# Patient Record
Sex: FEMALE | Race: WHITE | Employment: OTHER | URBAN - NONMETROPOLITAN AREA
[De-identification: names, ages, dates, MRNs, and addresses within clinical notes are randomized per-mention and may not be internally consistent; named-entity substitution may affect disease eponyms.]

---

## 2020-08-18 ENCOUNTER — APPOINTMENT (OUTPATIENT)
Dept: GENERAL RADIOLOGY | Age: 70
End: 2020-08-18
Payer: MEDICARE

## 2020-08-18 ENCOUNTER — HOSPITAL ENCOUNTER (EMERGENCY)
Age: 70
Discharge: HOME | End: 2020-08-18
Attending: EMERGENCY MEDICINE
Payer: MEDICARE

## 2020-08-18 VITALS
TEMPERATURE: 97.5 F | WEIGHT: 123 LBS | RESPIRATION RATE: 18 BRPM | HEIGHT: 63 IN | OXYGEN SATURATION: 100 % | HEART RATE: 76 BPM | SYSTOLIC BLOOD PRESSURE: 148 MMHG | DIASTOLIC BLOOD PRESSURE: 88 MMHG | BODY MASS INDEX: 21.79 KG/M2

## 2020-08-18 PROCEDURE — 71045 X-RAY EXAM CHEST 1 VIEW: CPT

## 2020-08-18 PROCEDURE — 99284 EMERGENCY DEPT VISIT MOD MDM: CPT

## 2020-08-18 PROCEDURE — 6370000000 HC RX 637 (ALT 250 FOR IP): Performed by: EMERGENCY MEDICINE

## 2020-08-18 RX ORDER — ACETAMINOPHEN 325 MG/1
650 TABLET ORAL ONCE
Status: COMPLETED | OUTPATIENT
Start: 2020-08-18 | End: 2020-08-18

## 2020-08-18 RX ORDER — SERTRALINE HYDROCHLORIDE 100 MG/1
100 TABLET, FILM COATED ORAL DAILY
COMMUNITY

## 2020-08-18 RX ADMIN — ACETAMINOPHEN 650 MG: 325 TABLET ORAL at 17:37

## 2020-08-18 ASSESSMENT — ENCOUNTER SYMPTOMS: GASTROINTESTINAL NEGATIVE: 1

## 2020-08-18 ASSESSMENT — PAIN DESCRIPTION - PAIN TYPE: TYPE: ACUTE PAIN

## 2020-08-18 ASSESSMENT — PAIN SCALES - GENERAL
PAINLEVEL_OUTOF10: 5
PAINLEVEL_OUTOF10: 6

## 2020-08-18 ASSESSMENT — PAIN DESCRIPTION - LOCATION: LOCATION: FACE
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
## 2020-08-18 NOTE — ED NOTES
C/O pain and swelling to upper w/ two sm cuts , bleeding controlled , teeth intact.  Denies LOC, DENIES HEAD OR NECK PAIN     Arcadio Lovell RN  08/18/20 6305

## 2021-05-20 PROBLEM — E05.90 HYPERTHYROIDISM: Status: ACTIVE | Noted: 2021-05-20

## 2021-05-20 PROBLEM — E05.00 GRAVES DISEASE: Status: ACTIVE | Noted: 2021-05-20

## 2023-11-06 ENCOUNTER — APPOINTMENT (OUTPATIENT)
Dept: CT IMAGING | Age: 73
End: 2023-11-06
Payer: MEDICARE

## 2023-11-06 ENCOUNTER — HOSPITAL ENCOUNTER (EMERGENCY)
Age: 73
Discharge: HOME | End: 2023-11-06
Attending: STUDENT IN AN ORGANIZED HEALTH CARE EDUCATION/TRAINING PROGRAM
Payer: MEDICARE

## 2023-11-06 VITALS
RESPIRATION RATE: 17 BRPM | OXYGEN SATURATION: 98 % | HEART RATE: 77 BPM | HEIGHT: 63 IN | WEIGHT: 152 LBS | BODY MASS INDEX: 26.93 KG/M2 | SYSTOLIC BLOOD PRESSURE: 175 MMHG | DIASTOLIC BLOOD PRESSURE: 90 MMHG | TEMPERATURE: 97.6 F

## 2023-11-06 DIAGNOSIS — S01.81XA FACIAL LACERATION, INITIAL ENCOUNTER: ICD-10-CM

## 2023-11-06 DIAGNOSIS — S09.90XA CLOSED HEAD INJURY, INITIAL ENCOUNTER: ICD-10-CM
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
## 2023-11-06 PROCEDURE — 70450 CT HEAD/BRAIN W/O DYE: CPT

## 2023-11-06 PROCEDURE — 90471 IMMUNIZATION ADMIN: CPT | Performed by: STUDENT IN AN ORGANIZED HEALTH CARE EDUCATION/TRAINING PROGRAM

## 2023-11-06 PROCEDURE — 72125 CT NECK SPINE W/O DYE: CPT

## 2023-11-06 PROCEDURE — 93005 ELECTROCARDIOGRAM TRACING: CPT | Performed by: STUDENT IN AN ORGANIZED HEALTH CARE EDUCATION/TRAINING PROGRAM

## 2023-11-06 PROCEDURE — 6360000002 HC RX W HCPCS: Performed by: STUDENT IN AN ORGANIZED HEALTH CARE EDUCATION/TRAINING PROGRAM

## 2023-11-06 PROCEDURE — 90715 TDAP VACCINE 7 YRS/> IM: CPT | Performed by: STUDENT IN AN ORGANIZED HEALTH CARE EDUCATION/TRAINING PROGRAM

## 2023-11-06 PROCEDURE — 12013 RPR F/E/E/N/L/M 2.6-5.0 CM: CPT

## 2023-11-06 PROCEDURE — 99284 EMERGENCY DEPT VISIT MOD MDM: CPT

## 2023-11-06 RX ORDER — ATORVASTATIN CALCIUM 10 MG/1
10 TABLET, FILM COATED ORAL DAILY
COMMUNITY
Start: 2023-10-27

## 2023-11-06 RX ORDER — IBUPROFEN 600 MG/1
600 TABLET ORAL ONCE
Status: DISCONTINUED | OUTPATIENT
Start: 2023-11-06 | End: 2023-11-06 | Stop reason: HOSPADM

## 2023-11-06 RX ORDER — TETANUS AND DIPHTHERIA TOXOIDS ADSORBED 2; 2 [LF]/.5ML; [LF]/.5ML
0.5 INJECTION INTRAMUSCULAR ONCE
Status: DISCONTINUED | OUTPATIENT
Start: 2023-11-06 | End: 2023-11-06 | Stop reason: HOSPADM

## 2023-11-06 RX ORDER — HYDROXYZINE HYDROCHLORIDE 25 MG/1
12.5-25 TABLET, FILM COATED ORAL 3 TIMES DAILY PRN
COMMUNITY
Start: 2023-10-27

## 2023-11-06 RX ADMIN — TETANUS TOXOID, REDUCED DIPHTHERIA TOXOID AND ACELLULAR PERTUSSIS VACCINE, ADSORBED 0.5 ML: 5; 2.5; 8; 8; 2.5 SUSPENSION INTRAMUSCULAR at 18:56

## 2023-11-06 ASSESSMENT — PAIN DESCRIPTION - LOCATION: LOCATION: FACE;NOSE

## 2023-11-06 ASSESSMENT — PAIN SCALES - GENERAL: PAINLEVEL_OUTOF10: 4

## 2023-11-07 LAB
EKG ATRIAL RATE: 77 BPM
EKG DIAGNOSIS: NORMAL
EKG P AXIS: 35 DEGREES
EKG P-R INTERVAL: 160 MS
EKG Q-T INTERVAL: 390 MS
EKG QRS DURATION: 76 MS
EKG QTC CALCULATION (BAZETT): 441 MS
EKG R AXIS: -12 DEGREES
EKG T AXIS: 18 DEGREES
EKG VENTRICULAR RATE: 77 BPM

## 2023-11-07 PROCEDURE — 93010 ELECTROCARDIOGRAM REPORT: CPT | Performed by: INTERNAL MEDICINE

## 2023-11-07 ASSESSMENT — ENCOUNTER SYMPTOMS
SHORTNESS OF BREATH: 0
ABDOMINAL PAIN: 0
BACK PAIN: 0

## 2023-11-07 NOTE — ED PROVIDER NOTES
DO  Emergency Medicine    (Please note that portions of this note were completed with a voice recognition program.  Efforts were made to edit the dictations but occasionally words are mis-transcribed.)       Lorri Villa DO  Resident  11/07/23 9831

## 2023-11-07 NOTE — DISCHARGE INSTRUCTIONS
You were seen here today after a fall. The CAT scan of your head and neck does not show any injuries. Your laceration was repaired with absorbable sutures. These will naturally dissolve in approximately 3 to 5 days. Do not get the sutures wet for the first 24 hours. Afterwards you may shower normally. Keep the wound clean and dry. You may place bacitracin Neosporin or triple antibiotic over it if you wish. For pain I recommend taking 1000 mg of Tylenol every 8 hours. You may take ibuprofen 600 mg every 6 hours with food if you need additional pain coverage. Return the emergency department for any puslike drainage from her nose, fevers, severe headache, vision changes, numbness tingling weakness, redness around the sutures or any other new worsening concerning complaints.

## 2023-11-17 ENCOUNTER — APPOINTMENT (OUTPATIENT)
Dept: GENERAL RADIOLOGY | Age: 73
End: 2023-11-17
Payer: MEDICARE

## 2023-11-17 ENCOUNTER — HOSPITAL ENCOUNTER (INPATIENT)
Age: 73
LOS: 3 days | Discharge: NURSING, REHAB OR LONG-TERM CARE | DRG: 481 | End: 2023-11-20
Attending: STUDENT IN AN ORGANIZED HEALTH CARE EDUCATION/TRAINING PROGRAM | Admitting: INTERNAL MEDICINE
Payer: MEDICARE

## 2023-11-17 ENCOUNTER — APPOINTMENT (OUTPATIENT)
Dept: CT IMAGING | Age: 73
End: 2023-11-17
Attending: STUDENT IN AN ORGANIZED HEALTH CARE EDUCATION/TRAINING PROGRAM
Payer: MEDICARE

## 2023-11-17 ENCOUNTER — APPOINTMENT (OUTPATIENT)
Dept: MRI IMAGING | Age: 73
End: 2023-11-17
Payer: MEDICARE

## 2023-11-17 ENCOUNTER — HOSPITAL ENCOUNTER (EMERGENCY)
Age: 73
Discharge: ANOTHER HOSPITAL | End: 2023-11-17
Attending: STUDENT IN AN ORGANIZED HEALTH CARE EDUCATION/TRAINING PROGRAM
Payer: MEDICARE

## 2023-11-17 VITALS
BODY MASS INDEX: 26.93 KG/M2 | SYSTOLIC BLOOD PRESSURE: 155 MMHG | RESPIRATION RATE: 18 BRPM | WEIGHT: 152 LBS | HEIGHT: 63 IN | OXYGEN SATURATION: 92 % | HEART RATE: 97 BPM | TEMPERATURE: 98.2 F | DIASTOLIC BLOOD PRESSURE: 78 MMHG

## 2023-11-17 DIAGNOSIS — S72.145A CLOSED NONDISPLACED INTERTROCHANTERIC FRACTURE OF LEFT FEMUR, INITIAL ENCOUNTER (HCC): Primary | ICD-10-CM

## 2023-11-17 DIAGNOSIS — S42.212A CLOSED DISPLACED FRACTURE OF SURGICAL NECK OF LEFT HUMERUS, UNSPECIFIED FRACTURE MORPHOLOGY, INITIAL ENCOUNTER: ICD-10-CM

## 2023-11-17 PROBLEM — S72.002A HIP FRACTURE REQUIRING OPERATIVE REPAIR, LEFT, CLOSED, INITIAL ENCOUNTER (HCC): Status: ACTIVE | Noted: 2023-11-17

## 2023-11-17 LAB
ANION GAP SERPL CALCULATED.3IONS-SCNC: 14 MMOL/L (ref 4–16)
BASOPHILS ABSOLUTE: 0.1 K/CU MM
BASOPHILS RELATIVE PERCENT: 0.5 % (ref 0–1)
BUN SERPL-MCNC: 17 MG/DL (ref 6–23)
CALCIUM SERPL-MCNC: 8.6 MG/DL (ref 8.3–10.6)
CHLORIDE BLD-SCNC: 109 MMOL/L (ref 99–110)
CO2: 20 MMOL/L (ref 21–32)
CREAT SERPL-MCNC: 0.6 MG/DL (ref 0.6–1.1)
DIFFERENTIAL TYPE: ABNORMAL
EOSINOPHILS ABSOLUTE: 0.3 K/CU MM
EOSINOPHILS RELATIVE PERCENT: 2.4 % (ref 0–3)
GFR SERPL CREATININE-BSD FRML MDRD: >60 ML/MIN/1.73M2
GLUCOSE SERPL-MCNC: 145 MG/DL (ref 70–99)
HCT VFR BLD CALC: 35.9 % (ref 37–47)
HEMOGLOBIN: 11.5 GM/DL (ref 12.5–16)
IMMATURE NEUTROPHIL %: 1.2 % (ref 0–0.43)
LYMPHOCYTES ABSOLUTE: 2.6 K/CU MM
LYMPHOCYTES RELATIVE PERCENT: 21.4 % (ref 24–44)
MCH RBC QN AUTO: 30.2 PG (ref 27–31)
MCHC RBC AUTO-ENTMCNC: 32 % (ref 32–36)
MCV RBC AUTO: 94.2 FL (ref 78–100)
MONOCYTES ABSOLUTE: 0.6 K/CU MM
MONOCYTES RELATIVE PERCENT: 5 % (ref 0–4)
PDW BLD-RTO: 13.2 % (ref 11.7–14.9)
PLATELET # BLD: 402 K/CU MM (ref 140–440)
PMV BLD AUTO: 8.9 FL (ref 7.5–11.1)
POTASSIUM SERPL-SCNC: 3.8 MMOL/L (ref 3.5–5.1)
RBC # BLD: 3.81 M/CU MM (ref 4.2–5.4)
SEGMENTED NEUTROPHILS ABSOLUTE COUNT: 8.6 K/CU MM
SEGMENTED NEUTROPHILS RELATIVE PERCENT: 69.5 % (ref 36–66)
SODIUM BLD-SCNC: 143 MMOL/L (ref 135–145)
TOTAL IMMATURE NEUTOROPHIL: 0.15 K/CU MM
WBC # BLD: 12.3 K/CU MM (ref 4–10.5)

## 2023-11-17 PROCEDURE — 71046 X-RAY EXAM CHEST 2 VIEWS: CPT

## 2023-11-17 PROCEDURE — 1200000000 HC SEMI PRIVATE

## 2023-11-17 PROCEDURE — 99285 EMERGENCY DEPT VISIT HI MDM: CPT

## 2023-11-17 PROCEDURE — 73030 X-RAY EXAM OF SHOULDER: CPT

## 2023-11-17 PROCEDURE — 6370000000 HC RX 637 (ALT 250 FOR IP): Performed by: STUDENT IN AN ORGANIZED HEALTH CARE EDUCATION/TRAINING PROGRAM

## 2023-11-17 PROCEDURE — 73721 MRI JNT OF LWR EXTRE W/O DYE: CPT

## 2023-11-17 PROCEDURE — 73502 X-RAY EXAM HIP UNI 2-3 VIEWS: CPT

## 2023-11-17 PROCEDURE — 73080 X-RAY EXAM OF ELBOW: CPT

## 2023-11-17 PROCEDURE — 73700 CT LOWER EXTREMITY W/O DYE: CPT

## 2023-11-17 PROCEDURE — 85025 COMPLETE CBC W/AUTO DIFF WBC: CPT

## 2023-11-17 PROCEDURE — 80048 BASIC METABOLIC PNL TOTAL CA: CPT

## 2023-11-17 RX ORDER — SODIUM CHLORIDE, SODIUM LACTATE, POTASSIUM CHLORIDE, CALCIUM CHLORIDE 600; 310; 30; 20 MG/100ML; MG/100ML; MG/100ML; MG/100ML
INJECTION, SOLUTION INTRAVENOUS CONTINUOUS
Status: DISCONTINUED | OUTPATIENT
Start: 2023-11-18 | End: 2023-11-19

## 2023-11-17 RX ORDER — IBUPROFEN 600 MG/1
600 TABLET ORAL ONCE
Status: COMPLETED | OUTPATIENT
Start: 2023-11-17 | End: 2023-11-17

## 2023-11-17 RX ORDER — ACETAMINOPHEN 500 MG
1000 TABLET ORAL ONCE
Status: COMPLETED | OUTPATIENT
Start: 2023-11-17 | End: 2023-11-17

## 2023-11-17 RX ORDER — MAGNESIUM SULFATE IN WATER 40 MG/ML
2000 INJECTION, SOLUTION INTRAVENOUS PRN
Status: DISCONTINUED | OUTPATIENT
Start: 2023-11-17 | End: 2023-11-20 | Stop reason: HOSPADM

## 2023-11-17 RX ORDER — SODIUM CHLORIDE 0.9 % (FLUSH) 0.9 %
5-40 SYRINGE (ML) INJECTION PRN
Status: DISCONTINUED | OUTPATIENT
Start: 2023-11-17 | End: 2023-11-20 | Stop reason: HOSPADM

## 2023-11-17 RX ORDER — SODIUM CHLORIDE 0.9 % (FLUSH) 0.9 %
5-40 SYRINGE (ML) INJECTION EVERY 12 HOURS SCHEDULED
Status: DISCONTINUED | OUTPATIENT
Start: 2023-11-18 | End: 2023-11-20 | Stop reason: HOSPADM

## 2023-11-17 RX ORDER — ACETAMINOPHEN 650 MG/1
650 SUPPOSITORY RECTAL EVERY 6 HOURS PRN
Status: DISCONTINUED | OUTPATIENT
Start: 2023-11-17 | End: 2023-11-20 | Stop reason: HOSPADM

## 2023-11-17 RX ORDER — ONDANSETRON 4 MG/1
4 TABLET, ORALLY DISINTEGRATING ORAL EVERY 8 HOURS PRN
Status: DISCONTINUED | OUTPATIENT
Start: 2023-11-17 | End: 2023-11-20 | Stop reason: HOSPADM

## 2023-11-17 RX ORDER — POTASSIUM CHLORIDE 7.45 MG/ML
10 INJECTION INTRAVENOUS PRN
Status: DISCONTINUED | OUTPATIENT
Start: 2023-11-17 | End: 2023-11-20 | Stop reason: HOSPADM

## 2023-11-17 RX ORDER — POTASSIUM CHLORIDE 20 MEQ/1
40 TABLET, EXTENDED RELEASE ORAL PRN
Status: DISCONTINUED | OUTPATIENT
Start: 2023-11-17 | End: 2023-11-20 | Stop reason: HOSPADM

## 2023-11-17 RX ORDER — SODIUM CHLORIDE 9 MG/ML
INJECTION, SOLUTION INTRAVENOUS PRN
Status: DISCONTINUED | OUTPATIENT
Start: 2023-11-17 | End: 2023-11-20 | Stop reason: HOSPADM

## 2023-11-17 RX ORDER — ENOXAPARIN SODIUM 100 MG/ML
40 INJECTION SUBCUTANEOUS DAILY
Status: DISCONTINUED | OUTPATIENT
Start: 2023-11-18 | End: 2023-11-20 | Stop reason: HOSPADM

## 2023-11-17 RX ORDER — ONDANSETRON 2 MG/ML
4 INJECTION INTRAMUSCULAR; INTRAVENOUS EVERY 6 HOURS PRN
Status: DISCONTINUED | OUTPATIENT
Start: 2023-11-17 | End: 2023-11-20 | Stop reason: HOSPADM

## 2023-11-17 RX ORDER — POLYETHYLENE GLYCOL 3350 17 G/17G
17 POWDER, FOR SOLUTION ORAL DAILY PRN
Status: DISCONTINUED | OUTPATIENT
Start: 2023-11-17 | End: 2023-11-20 | Stop reason: HOSPADM

## 2023-11-17 RX ORDER — ACETAMINOPHEN 325 MG/1
650 TABLET ORAL EVERY 6 HOURS PRN
Status: DISCONTINUED | OUTPATIENT
Start: 2023-11-17 | End: 2023-11-20 | Stop reason: HOSPADM

## 2023-11-17 RX ORDER — NAPROXEN 500 MG/1
500 TABLET ORAL 2 TIMES DAILY WITH MEALS
Status: ON HOLD | COMMUNITY

## 2023-11-17 RX ADMIN — IBUPROFEN 600 MG: 600 TABLET, FILM COATED ORAL at 14:48

## 2023-11-17 RX ADMIN — ACETAMINOPHEN 1000 MG: 500 TABLET ORAL at 10:50

## 2023-11-17 ASSESSMENT — PAIN SCALES - GENERAL
PAINLEVEL_OUTOF10: 9
PAINLEVEL_OUTOF10: 1
PAINLEVEL_OUTOF10: 8
PAINLEVEL_OUTOF10: 6

## 2023-11-17 ASSESSMENT — LIFESTYLE VARIABLES
HOW OFTEN DO YOU HAVE A DRINK CONTAINING ALCOHOL: NEVER
HOW MANY STANDARD DRINKS CONTAINING ALCOHOL DO YOU HAVE ON A TYPICAL DAY: PATIENT DOES NOT DRINK

## 2023-11-17 ASSESSMENT — PAIN DESCRIPTION - FREQUENCY: FREQUENCY: INTERMITTENT

## 2023-11-17 ASSESSMENT — PAIN - FUNCTIONAL ASSESSMENT
PAIN_FUNCTIONAL_ASSESSMENT: PREVENTS OR INTERFERES SOME ACTIVE ACTIVITIES AND ADLS
PAIN_FUNCTIONAL_ASSESSMENT: 0-10

## 2023-11-17 ASSESSMENT — PAIN DESCRIPTION - PAIN TYPE: TYPE: ACUTE PAIN

## 2023-11-17 ASSESSMENT — ENCOUNTER SYMPTOMS
COUGH: 0
VOMITING: 0
COLOR CHANGE: 1
SHORTNESS OF BREATH: 0
NAUSEA: 0
ABDOMINAL PAIN: 0

## 2023-11-17 ASSESSMENT — PAIN DESCRIPTION - ORIENTATION
ORIENTATION: LEFT

## 2023-11-17 ASSESSMENT — PAIN DESCRIPTION - DESCRIPTORS
DESCRIPTORS: ACHING
DESCRIPTORS: THROBBING
DESCRIPTORS: SHARP;ACHING

## 2023-11-17 ASSESSMENT — PAIN DESCRIPTION - LOCATION
LOCATION: HIP;SHOULDER
LOCATION: HIP
LOCATION: ARM
LOCATION: GROIN;ARM

## 2023-11-17 NOTE — ED NOTES
Arm sling with shoulder immobilizer to left arm. Pt tolerated well.  remains at bedside.      Kurt Parks RN  35/33/56 6820

## 2023-11-17 NOTE — ED NOTES
Patient assisted to bedside commode. Non-weight bearing on left leg. Tolerated well.      Atilio Gill RN  49/97/65 2068

## 2023-11-17 NOTE — ED NOTES
Pt ambulated in hallway. Pt still having pain with ambulation in left hip and leg. Dr. Aisha Rao in [place] to see pt ambulate. See further orders from Dr. Aisha Rao.      Shalini Bolton RN  83/12/18 5911

## 2023-11-18 ENCOUNTER — ANESTHESIA (OUTPATIENT)
Dept: OPERATING ROOM | Age: 73
End: 2023-11-18
Payer: MEDICARE

## 2023-11-18 ENCOUNTER — ANESTHESIA EVENT (OUTPATIENT)
Dept: OPERATING ROOM | Age: 73
End: 2023-11-18
Payer: MEDICARE

## 2023-11-18 ENCOUNTER — APPOINTMENT (OUTPATIENT)
Dept: GENERAL RADIOLOGY | Age: 73
DRG: 481 | End: 2023-11-18
Attending: STUDENT IN AN ORGANIZED HEALTH CARE EDUCATION/TRAINING PROGRAM
Payer: MEDICARE

## 2023-11-18 LAB
ANION GAP SERPL CALCULATED.3IONS-SCNC: 9 MMOL/L (ref 4–16)
BASOPHILS ABSOLUTE: 0.1 K/CU MM
BASOPHILS RELATIVE PERCENT: 0.5 % (ref 0–1)
BUN SERPL-MCNC: 18 MG/DL (ref 6–23)
CALCIUM SERPL-MCNC: 8 MG/DL (ref 8.3–10.6)
CHLORIDE BLD-SCNC: 112 MMOL/L (ref 99–110)
CO2: 22 MMOL/L (ref 21–32)
CREAT SERPL-MCNC: 0.6 MG/DL (ref 0.6–1.1)
DIFFERENTIAL TYPE: ABNORMAL
EOSINOPHILS ABSOLUTE: 0.4 K/CU MM
EOSINOPHILS RELATIVE PERCENT: 3.3 % (ref 0–3)
GFR SERPL CREATININE-BSD FRML MDRD: >60 ML/MIN/1.73M2
GLUCOSE SERPL-MCNC: 99 MG/DL (ref 70–99)
HCT VFR BLD CALC: 32.3 % (ref 37–47)
HEMOGLOBIN: 10.6 GM/DL (ref 12.5–16)
IMMATURE NEUTROPHIL %: 1.5 % (ref 0–0.43)
INR BLD: 1 INDEX
LYMPHOCYTES ABSOLUTE: 2.8 K/CU MM
LYMPHOCYTES RELATIVE PERCENT: 24.9 % (ref 24–44)
MCH RBC QN AUTO: 30.5 PG (ref 27–31)
MCHC RBC AUTO-ENTMCNC: 32.8 % (ref 32–36)
MCV RBC AUTO: 93.1 FL (ref 78–100)
MONOCYTES ABSOLUTE: 0.7 K/CU MM
MONOCYTES RELATIVE PERCENT: 6.4 % (ref 0–4)
NUCLEATED RBC %: 0 %
PDW BLD-RTO: 13.2 % (ref 11.7–14.9)
PLATELET # BLD: 339 K/CU MM (ref 140–440)
PMV BLD AUTO: 8.5 FL (ref 7.5–11.1)
POTASSIUM SERPL-SCNC: 4.2 MMOL/L (ref 3.5–5.1)
PROTHROMBIN TIME: 13.7 SECONDS (ref 11.7–14.5)
RBC # BLD: 3.47 M/CU MM (ref 4.2–5.4)
SEGMENTED NEUTROPHILS ABSOLUTE COUNT: 7 K/CU MM
SEGMENTED NEUTROPHILS RELATIVE PERCENT: 63.4 % (ref 36–66)
SODIUM BLD-SCNC: 143 MMOL/L (ref 135–145)
TOTAL IMMATURE NEUTOROPHIL: 0.17 K/CU MM
TOTAL NUCLEATED RBC: 0 K/CU MM
WBC # BLD: 11 K/CU MM (ref 4–10.5)

## 2023-11-18 PROCEDURE — 6360000002 HC RX W HCPCS: Performed by: INTERNAL MEDICINE

## 2023-11-18 PROCEDURE — 94761 N-INVAS EAR/PLS OXIMETRY MLT: CPT

## 2023-11-18 PROCEDURE — 6370000000 HC RX 637 (ALT 250 FOR IP): Performed by: ORTHOPAEDIC SURGERY

## 2023-11-18 PROCEDURE — P9045 ALBUMIN (HUMAN), 5%, 250 ML: HCPCS | Performed by: NURSE ANESTHETIST, CERTIFIED REGISTERED

## 2023-11-18 PROCEDURE — 7100000001 HC PACU RECOVERY - ADDTL 15 MIN: Performed by: ORTHOPAEDIC SURGERY

## 2023-11-18 PROCEDURE — 2700000000 HC OXYGEN THERAPY PER DAY

## 2023-11-18 PROCEDURE — 36415 COLL VENOUS BLD VENIPUNCTURE: CPT

## 2023-11-18 PROCEDURE — 93005 ELECTROCARDIOGRAM TRACING: CPT | Performed by: INTERNAL MEDICINE

## 2023-11-18 PROCEDURE — C1769 GUIDE WIRE: HCPCS | Performed by: ORTHOPAEDIC SURGERY

## 2023-11-18 PROCEDURE — C1713 ANCHOR/SCREW BN/BN,TIS/BN: HCPCS | Performed by: ORTHOPAEDIC SURGERY

## 2023-11-18 PROCEDURE — 3700000001 HC ADD 15 MINUTES (ANESTHESIA): Performed by: ORTHOPAEDIC SURGERY

## 2023-11-18 PROCEDURE — 0QS706Z REPOSITION LEFT UPPER FEMUR WITH INTRAMEDULLARY INTERNAL FIXATION DEVICE, OPEN APPROACH: ICD-10-PCS | Performed by: ORTHOPAEDIC SURGERY

## 2023-11-18 PROCEDURE — 6360000002 HC RX W HCPCS: Performed by: NURSE ANESTHETIST, CERTIFIED REGISTERED

## 2023-11-18 PROCEDURE — 2709999900 HC NON-CHARGEABLE SUPPLY: Performed by: ORTHOPAEDIC SURGERY

## 2023-11-18 PROCEDURE — 7100000000 HC PACU RECOVERY - FIRST 15 MIN: Performed by: ORTHOPAEDIC SURGERY

## 2023-11-18 PROCEDURE — 6360000002 HC RX W HCPCS: Performed by: ANESTHESIOLOGY

## 2023-11-18 PROCEDURE — 85025 COMPLETE CBC W/AUTO DIFF WBC: CPT

## 2023-11-18 PROCEDURE — 80048 BASIC METABOLIC PNL TOTAL CA: CPT

## 2023-11-18 PROCEDURE — 3600000004 HC SURGERY LEVEL 4 BASE: Performed by: ORTHOPAEDIC SURGERY

## 2023-11-18 PROCEDURE — 3700000000 HC ANESTHESIA ATTENDED CARE: Performed by: ORTHOPAEDIC SURGERY

## 2023-11-18 PROCEDURE — 3600000014 HC SURGERY LEVEL 4 ADDTL 15MIN: Performed by: ORTHOPAEDIC SURGERY

## 2023-11-18 PROCEDURE — 2720000010 HC SURG SUPPLY STERILE: Performed by: ORTHOPAEDIC SURGERY

## 2023-11-18 PROCEDURE — 76000 FLUOROSCOPY <1 HR PHYS/QHP: CPT

## 2023-11-18 PROCEDURE — 1200000000 HC SEMI PRIVATE

## 2023-11-18 PROCEDURE — 2580000003 HC RX 258: Performed by: INTERNAL MEDICINE

## 2023-11-18 PROCEDURE — 6360000002 HC RX W HCPCS: Performed by: ORTHOPAEDIC SURGERY

## 2023-11-18 PROCEDURE — 85610 PROTHROMBIN TIME: CPT

## 2023-11-18 DEVICE — SCREW BNE L38MM DIA5MM TIB LT GRN TI ST CANN LOK FULL THRD: Type: IMPLANTABLE DEVICE | Site: HIP | Status: FUNCTIONAL

## 2023-11-18 DEVICE — IMPLANTABLE DEVICE: Type: IMPLANTABLE DEVICE | Site: HIP | Status: FUNCTIONAL

## 2023-11-18 DEVICE — NAIL IM L380MM DIA10MM 125DEG LNG L PROX FEM GRN TI CANN: Type: IMPLANTABLE DEVICE | Site: HIP | Status: FUNCTIONAL

## 2023-11-18 RX ORDER — SODIUM CHLORIDE 0.9 % (FLUSH) 0.9 %
5-40 SYRINGE (ML) INJECTION EVERY 12 HOURS SCHEDULED
Status: DISCONTINUED | OUTPATIENT
Start: 2023-11-18 | End: 2023-11-18 | Stop reason: HOSPADM

## 2023-11-18 RX ORDER — KETOROLAC TROMETHAMINE 30 MG/ML
15 INJECTION, SOLUTION INTRAMUSCULAR; INTRAVENOUS EVERY 6 HOURS PRN
Status: DISCONTINUED | OUTPATIENT
Start: 2023-11-18 | End: 2023-11-20

## 2023-11-18 RX ORDER — MEPERIDINE HYDROCHLORIDE 25 MG/ML
12.5 INJECTION INTRAMUSCULAR; INTRAVENOUS; SUBCUTANEOUS EVERY 5 MIN PRN
Status: DISCONTINUED | OUTPATIENT
Start: 2023-11-18 | End: 2023-11-18 | Stop reason: HOSPADM

## 2023-11-18 RX ORDER — ONDANSETRON 2 MG/ML
INJECTION INTRAMUSCULAR; INTRAVENOUS PRN
Status: DISCONTINUED | OUTPATIENT
Start: 2023-11-18 | End: 2023-11-18 | Stop reason: SDUPTHER

## 2023-11-18 RX ORDER — ALBUMIN, HUMAN INJ 5% 5 %
SOLUTION INTRAVENOUS PRN
Status: DISCONTINUED | OUTPATIENT
Start: 2023-11-18 | End: 2023-11-18 | Stop reason: SDUPTHER

## 2023-11-18 RX ORDER — LIDOCAINE HYDROCHLORIDE 20 MG/ML
INJECTION, SOLUTION INTRAVENOUS PRN
Status: DISCONTINUED | OUTPATIENT
Start: 2023-11-18 | End: 2023-11-18 | Stop reason: SDUPTHER

## 2023-11-18 RX ORDER — ATORVASTATIN CALCIUM 10 MG/1
10 TABLET, FILM COATED ORAL NIGHTLY
Status: DISCONTINUED | OUTPATIENT
Start: 2023-11-18 | End: 2023-11-20 | Stop reason: HOSPADM

## 2023-11-18 RX ORDER — PROPOFOL 10 MG/ML
INJECTION, EMULSION INTRAVENOUS CONTINUOUS PRN
Status: DISCONTINUED | OUTPATIENT
Start: 2023-11-18 | End: 2023-11-18 | Stop reason: SDUPTHER

## 2023-11-18 RX ORDER — DROPERIDOL 2.5 MG/ML
0.62 INJECTION, SOLUTION INTRAMUSCULAR; INTRAVENOUS
Status: DISCONTINUED | OUTPATIENT
Start: 2023-11-18 | End: 2023-11-18 | Stop reason: HOSPADM

## 2023-11-18 RX ORDER — OXYCODONE HYDROCHLORIDE 5 MG/1
5 TABLET ORAL
Status: DISCONTINUED | OUTPATIENT
Start: 2023-11-18 | End: 2023-11-18 | Stop reason: HOSPADM

## 2023-11-18 RX ORDER — SODIUM CHLORIDE 9 MG/ML
INJECTION, SOLUTION INTRAVENOUS PRN
Status: DISCONTINUED | OUTPATIENT
Start: 2023-11-18 | End: 2023-11-18 | Stop reason: HOSPADM

## 2023-11-18 RX ORDER — PHENYLEPHRINE HCL IN 0.9% NACL 1 MG/10 ML
SYRINGE (ML) INTRAVENOUS PRN
Status: DISCONTINUED | OUTPATIENT
Start: 2023-11-18 | End: 2023-11-18 | Stop reason: SDUPTHER

## 2023-11-18 RX ORDER — BUPIVACAINE HYDROCHLORIDE 7.5 MG/ML
INJECTION, SOLUTION INTRASPINAL
Status: COMPLETED | OUTPATIENT
Start: 2023-11-18 | End: 2023-11-18

## 2023-11-18 RX ORDER — DEXAMETHASONE SODIUM PHOSPHATE 4 MG/ML
INJECTION, SOLUTION INTRA-ARTICULAR; INTRALESIONAL; INTRAMUSCULAR; INTRAVENOUS; SOFT TISSUE PRN
Status: DISCONTINUED | OUTPATIENT
Start: 2023-11-18 | End: 2023-11-18 | Stop reason: SDUPTHER

## 2023-11-18 RX ORDER — CEFAZOLIN SODIUM 1 G/3ML
INJECTION, POWDER, FOR SOLUTION INTRAMUSCULAR; INTRAVENOUS PRN
Status: DISCONTINUED | OUTPATIENT
Start: 2023-11-18 | End: 2023-11-18 | Stop reason: SDUPTHER

## 2023-11-18 RX ORDER — HYDRALAZINE HYDROCHLORIDE 20 MG/ML
10 INJECTION INTRAMUSCULAR; INTRAVENOUS
Status: DISCONTINUED | OUTPATIENT
Start: 2023-11-18 | End: 2023-11-18 | Stop reason: HOSPADM

## 2023-11-18 RX ORDER — FENTANYL CITRATE 50 UG/ML
50 INJECTION, SOLUTION INTRAMUSCULAR; INTRAVENOUS EVERY 5 MIN PRN
Status: DISCONTINUED | OUTPATIENT
Start: 2023-11-18 | End: 2023-11-18 | Stop reason: HOSPADM

## 2023-11-18 RX ORDER — PROPOFOL 10 MG/ML
INJECTION, EMULSION INTRAVENOUS PRN
Status: DISCONTINUED | OUTPATIENT
Start: 2023-11-18 | End: 2023-11-18 | Stop reason: SDUPTHER

## 2023-11-18 RX ORDER — SODIUM CHLORIDE, SODIUM LACTATE, POTASSIUM CHLORIDE, CALCIUM CHLORIDE 600; 310; 30; 20 MG/100ML; MG/100ML; MG/100ML; MG/100ML
INJECTION, SOLUTION INTRAVENOUS CONTINUOUS PRN
Status: DISCONTINUED | OUTPATIENT
Start: 2023-11-18 | End: 2023-11-18 | Stop reason: SDUPTHER

## 2023-11-18 RX ORDER — ONDANSETRON 2 MG/ML
4 INJECTION INTRAMUSCULAR; INTRAVENOUS
Status: DISCONTINUED | OUTPATIENT
Start: 2023-11-18 | End: 2023-11-18 | Stop reason: HOSPADM

## 2023-11-18 RX ORDER — SODIUM CHLORIDE 9 MG/ML
INJECTION, SOLUTION INTRAVENOUS CONTINUOUS
Status: DISCONTINUED | OUTPATIENT
Start: 2023-11-18 | End: 2023-11-19

## 2023-11-18 RX ORDER — LABETALOL HYDROCHLORIDE 5 MG/ML
10 INJECTION, SOLUTION INTRAVENOUS
Status: DISCONTINUED | OUTPATIENT
Start: 2023-11-18 | End: 2023-11-18 | Stop reason: HOSPADM

## 2023-11-18 RX ORDER — SODIUM CHLORIDE 0.9 % (FLUSH) 0.9 %
5-40 SYRINGE (ML) INJECTION PRN
Status: DISCONTINUED | OUTPATIENT
Start: 2023-11-18 | End: 2023-11-18 | Stop reason: HOSPADM

## 2023-11-18 RX ADMIN — SALINE NASAL SPRAY 2 SPRAY: 1.5 SOLUTION NASAL at 21:05

## 2023-11-18 RX ADMIN — SODIUM CHLORIDE, PRESERVATIVE FREE 10 ML: 5 INJECTION INTRAVENOUS at 00:58

## 2023-11-18 RX ADMIN — PROPOFOL 120 MCG/KG/MIN: 10 INJECTION, EMULSION INTRAVENOUS at 12:53

## 2023-11-18 RX ADMIN — SERTRALINE HYDROCHLORIDE 100 MG: 50 TABLET ORAL at 20:12

## 2023-11-18 RX ADMIN — SODIUM CHLORIDE, POTASSIUM CHLORIDE, SODIUM LACTATE AND CALCIUM CHLORIDE: 600; 310; 30; 20 INJECTION, SOLUTION INTRAVENOUS at 01:01

## 2023-11-18 RX ADMIN — Medication 100 MCG: at 12:53

## 2023-11-18 RX ADMIN — Medication 100 MCG: at 13:02

## 2023-11-18 RX ADMIN — CEFAZOLIN 2 G: 1 INJECTION, POWDER, FOR SOLUTION INTRAMUSCULAR; INTRAVENOUS; PARENTERAL at 12:57

## 2023-11-18 RX ADMIN — ACETAMINOPHEN 650 MG: 325 TABLET ORAL at 22:29

## 2023-11-18 RX ADMIN — ALBUMIN (HUMAN) 250 ML: 12.5 INJECTION, SOLUTION INTRAVENOUS at 12:55

## 2023-11-18 RX ADMIN — Medication 100 MCG: at 12:45

## 2023-11-18 RX ADMIN — ATORVASTATIN CALCIUM 10 MG: 10 TABLET, FILM COATED ORAL at 20:12

## 2023-11-18 RX ADMIN — ONDANSETRON 4 MG: 2 INJECTION INTRAMUSCULAR; INTRAVENOUS at 13:15

## 2023-11-18 RX ADMIN — Medication 100 MCG: at 13:15

## 2023-11-18 RX ADMIN — SODIUM CHLORIDE, SODIUM LACTATE, POTASSIUM CHLORIDE, CALCIUM CHLORIDE: 600; 310; 30; 20 INJECTION, SOLUTION INTRAVENOUS at 12:30

## 2023-11-18 RX ADMIN — LIDOCAINE HYDROCHLORIDE 40 MG: 20 INJECTION, SOLUTION INTRAVENOUS at 12:37

## 2023-11-18 RX ADMIN — KETOROLAC TROMETHAMINE 15 MG: 30 INJECTION, SOLUTION INTRAMUSCULAR; INTRAVENOUS at 20:11

## 2023-11-18 RX ADMIN — KETOROLAC TROMETHAMINE 15 MG: 30 INJECTION, SOLUTION INTRAMUSCULAR; INTRAVENOUS at 01:07

## 2023-11-18 RX ADMIN — DEXAMETHASONE SODIUM PHOSPHATE 4 MG: 4 INJECTION, SOLUTION INTRAMUSCULAR; INTRAVENOUS at 13:15

## 2023-11-18 RX ADMIN — BUPIVACAINE HYDROCHLORIDE IN DEXTROSE 10 MG: 7.5 INJECTION, SOLUTION SUBARACHNOID at 12:43

## 2023-11-18 RX ADMIN — PROPOFOL 50 MG: 10 INJECTION, EMULSION INTRAVENOUS at 12:37

## 2023-11-18 ASSESSMENT — PAIN SCALES - GENERAL
PAINLEVEL_OUTOF10: 7
PAINLEVEL_OUTOF10: 7
PAINLEVEL_OUTOF10: 0
PAINLEVEL_OUTOF10: 8
PAINLEVEL_OUTOF10: 0

## 2023-11-18 ASSESSMENT — PAIN DESCRIPTION - LOCATION
LOCATION: ARM;HIP
LOCATION: HEAD
LOCATION: SHOULDER;ELBOW

## 2023-11-18 ASSESSMENT — PAIN DESCRIPTION - ORIENTATION
ORIENTATION: MID
ORIENTATION: LEFT
ORIENTATION: LEFT

## 2023-11-18 ASSESSMENT — PAIN DESCRIPTION - ONSET: ONSET: ON-GOING

## 2023-11-18 ASSESSMENT — PAIN DESCRIPTION - DESCRIPTORS
DESCRIPTORS: SHOOTING
DESCRIPTORS: ACHING

## 2023-11-18 ASSESSMENT — PAIN DESCRIPTION - PAIN TYPE: TYPE: SURGICAL PAIN

## 2023-11-18 ASSESSMENT — PAIN - FUNCTIONAL ASSESSMENT
PAIN_FUNCTIONAL_ASSESSMENT: PREVENTS OR INTERFERES SOME ACTIVE ACTIVITIES AND ADLS
PAIN_FUNCTIONAL_ASSESSMENT: ACTIVITIES ARE NOT PREVENTED
PAIN_FUNCTIONAL_ASSESSMENT: PREVENTS OR INTERFERES SOME ACTIVE ACTIVITIES AND ADLS

## 2023-11-18 ASSESSMENT — PAIN DESCRIPTION - FREQUENCY: FREQUENCY: INTERMITTENT

## 2023-11-18 NOTE — PROGRESS NOTES
Patient back on the unit from PACU. Patient is alert and awake and resting quietly in bed. Patient denies pain at this time. Patient is able to move feet,toes with out pain. Noted pedal pulses are strong. Patient is starting to have feeling in both feet. No other concerns voiced. Vitals WNL.   134/66 HR 78 Resp 18 O2 95% on 2L per NS.

## 2023-11-18 NOTE — PROGRESS NOTES
4 Eyes Skin Assessment     NAME:  Hali Pack  YOB: 1950  MEDICAL RECORD NUMBER:  [id number]    The patient is being assessed for  Admission    I agree that at least one RN has performed a thorough Head to Toe Skin Assessment on the patient. ALL assessment sites listed below have been assessed. Areas assessed by both nurses:    Head, Face, Ears, Shoulders, Back, Chest, Arms, Elbows, Hands, Sacrum. Buttock, Coccyx, Ischium, Legs. Feet and Heels, and Under Medical Devices         Does the Patient have a Wound? Yes wound(s) were present on assessment.  LDA wound assessment was Initiated and completed by RN       Kendall Prevention initiated by RN: Yes  Wound Care Orders initiated by RN: Yes    Pressure Injury (Stage 3,4, Unstageable, DTI, NWPT, and Complex wounds) if present, place Wound referral order by RN under : No    New Ostomies, if present place, Ostomy referral order under : No     Nurse 1 eSignature: Electronically signed by Marques Sánchez RN on 11/17/23 at 9:35 PM EST    **SHARE this note so that the co-signing nurse can place an eSignature**    Nurse 2 eSignature: Electronically signed by Daquan Anaya LPN on 91/03/36 at 2:19 PM EST

## 2023-11-18 NOTE — PROGRESS NOTES
1355: Pt arrived to PACU from OR. Monitors applied, alarms on. Surgical sites clean and dry x3 sites. Report obtained from Dr. Mitchel De Leon and Aiden Lindsay RN. Pt able to wiggle toes, states feeling at just below belly button. Will continue to monitor. 1415: Pt states feeling to mid thigh at this time. 1430: Pt able to wiggle carol toes, and states feeling to knee. No complaints of pain at this time. Report called to [facility] for room 1120.   1435: Pt transferred to room 1120.

## 2023-11-18 NOTE — ANESTHESIA POSTPROCEDURE EVALUATION
Department of Anesthesiology  Postprocedure Note    Patient: Christina Puri  MRN: [id number]  YOB: 1950  Date of evaluation: 11/18/2023      Procedure Summary     Date: 11/18/23 Room / Location: [address] [place] [address] [address] [facility]    Anesthesia Start: 8928 Anesthesia Stop:     Procedure: HIP IM NAIL BRIE INSERTION (Left: Hip) Diagnosis:       Closed fracture of left hip, initial encounter ([address])      (Closed fracture of left hip, initial encounter ([address]) Elisha Lanier)    Surgeons: Arturo Urias MD Responsible Provider: Critsina Johnson MD    Anesthesia Type: spinal, MAC ASA Status: 2 - Emergent          Anesthesia Type: No value filed.     Valentina Phase I:      Valentina Phase II:        Anesthesia Post Evaluation    Patient location during evaluation: PACU  Patient participation: complete - patient participated  Level of consciousness: awake  Pain score: 0  Airway patency: patent  Nausea & Vomiting: no nausea and no vomiting  Complications: no  Cardiovascular status: hemodynamically stable  Respiratory status: acceptable and face mask  Hydration status: stable  Pain management: adequate

## 2023-11-18 NOTE — CONSULTS
Consultation    Christina Khushi (1950)    11/18/2023      CHIEF COMPLAINT: 1. Left hip IT fracture 2. left shoulder proximal humerus fracture    History Obtained From:  patient, electronic medical record    HISTORY OF PRESENT ILLNESS:      The patient is a 67 y.o. female  who presents with the above injuries. Patient fell and xray / MRI evaluation at [facility] identified the fractures. Patient was admitted for medical management and orthopedic consultation was obtained for fracture care. Past Medical History         Diagnosis Date    Hyperlipidemia        Past Surgical History         Procedure Laterality Date    APPENDECTOMY      CATARACT REMOVAL      CHOLECYSTECTOMY      COLONOSCOPY      EYE SURGERY      HYSTERECTOMY, TOTAL ABDOMINAL (CERVIX REMOVED)      TOTAL THYROIDECTOMY         Medications Prior to Admission:     Prior to Admission medications    Medication Sig Start Date End Date Taking? Authorizing Provider   naproxen (NAPROSYN) 500 MG tablet Take 1 tablet by mouth 2 times daily (with meals)    Gita Johnson MD   atorvastatin (LIPITOR) 10 MG tablet Take 1 tablet by mouth nightly 10/27/23   Gita Johnson MD   hydrOXYzine HCl (ATARAX) 25 MG tablet Take 0.5-1 tablets by mouth 3 times daily as needed  Patient not taking: Reported on 11/17/2023 10/27/23   Gita Johnson MD   levothyroxine (SYNTHROID) 50 MCG tablet Take 1 tablet by mouth daily  Patient taking differently: Take 137 mcg by mouth every morning (before breakfast) 6/4/21   Nory Barr MD   sertraline (ZOLOFT) 100 MG tablet Take 1 tablet by mouth nightly    ProviderGita MD       Allergies     Tetracyclines & related    Social History   TOBACCO:   reports that she has never smoked. She has never used smokeless tobacco.  ETOH:   reports no history of alcohol use.   Patient currently lives with family      Family History

## 2023-11-18 NOTE — OP NOTE
Operative Note      Patient: Alexsandra Garber  YOB: 1950  MRN: [id number]    Date of Procedure: 11/18/2023    Pre-Op Diagnosis Codes:     * Closed fracture of left hip, initial encounter ([address] ) [S72.002A]    Post-Op Diagnosis: Same       Procedure(s):  HIP IM NAIL BRIE INSERTION    Surgeon(s):  Mika Riojas MD    Assistant:   * No surgical staff found *    Anesthesia: Spinal    Estimated Blood Loss (mL): less than 50     Complications: None    Specimens:   * No specimens in log *    Implants:  Implant Name Type Inv. Item Serial No.  Lot No. LRB No. Used Action   NAIL IM [id number] [id number] 125DEG LNG L PROX FEM GRN TI KAL[place]NA - [id number]  NAIL IM L380MM [id number] 125DEG LNG L PROX FEM GRN TI KAL[place]NA  DEPU[employer]WD [id number] Left 1 Implanted   BLADE IM L80MM DIA10.35MM PROX FEM G TI K[place]NA FEN ARNOLD FOR - [id number]  BLADE IM L80MM DIA10.35MM PROX FEM G TI KAL[place]NA FEN ARNOLD FOR  DEPU[employer]WD [id number] Left 1 Implanted   SCREW BNE L38MM DIA5MM TIB LT GRN TI ST KAL[place]NA HELENA FULL THRD - [id number]  SCREW BNE L38MM DIA5MM TIB LT GRN TI ST [place] HELENA FULL THRD  DEPU[employer]- [id number] Left 1 Implanted         Drains: * No LDAs found *    Findings: see dictation        Detailed Description of Procedure:         Surgical Indications  The patient presented to the emergency room and was diagnosed with an Intertrochanteric hip fracture. The patient was admitted to the hospital and received medical clearance. The patient chose to proceed with closed reduction as needed with intramedullary nailing. Risks, benefits, expected outcomes and potential complications were discussed. At no time were any guarantees implied or stated. The patient electively signed the consent form. Patient Positioning and Surgical Prep  The patient was seen in the holding area and the appropriate extremity marked with an indelible pen.   The patient was taken to the operative suite, identified and while on the hospital bed, spinal anesthesia was administered. The patient was transferred to the fracture table. The affected leg was placed in boot traction after the foot was well padded. The unaffected leg was gently abducted and externally rotated. The fracture was well visualized using biplanar fluoroscopy and the fracture reduced or manipulated as required. The lower extremity was prepped from the flank to knee with Duraprep and then draped in the normal sterile fashion. Exposure  An incision was made proximal to the greater trochanter. The fascia and muscle were split in line with their fibers. Using biplanar, c-arm fluoroscopy. A starting hole was identified at the tip of the greater trochanter. A guide wire was inserted and a 17mm cannulated reamer introduced to open the proximal femur. The long reaming guide wire was inserted into the medullary canal and the proximal femur reamed as necessary. Insertion of Long Trochanteric Femoral Nail  The long trochanteric femoral nail was attached to the insertion handle and inserted over the reaming guide wire. The guide wire was removed. An incision was made along the lateral aspect of the thigh and through the fascia. The blade guide sleeve was inserted and snapped into the aiming guide. The sleeve assembly was advanced to the lateral femur. A 3.2 mm guide wire was drilled into the femoral head and the length for the helical blade was measured. The 11.0 cannulated drill was used to ream out the lateral cortex. The helical blade was assembled to the  and impacted into place. Using the flexible screwdriver, the preassembled locking mechanism was engaged by advancing the screw. The  and aiming guide were disengaged. Final fluoroscopic views were obtained. Closure and Disposition  The wounds were copiously irrigated and closed in layers. Sterile dressings were applied. All sponge and needle counts were correct.   The patient was awakened

## 2023-11-18 NOTE — PROGRESS NOTES
Left shoulder proximal humerus fracture, left hip IT fracture. Xrays reviewed. Planning on left hip ORIF and conservative treatment for left shoulder if patient is amenable to the treatment plan. Surgery on 11/18 for left hip if medically able. Full consult to follow.

## 2023-11-18 NOTE — ANESTHESIA PROCEDURE NOTES
Spinal Block    Patient location during procedure: OR  End time: 11/18/2023 12:41 PM  Reason for block: primary anesthetic and at surgeon's request  Staffing  Performed: resident/CRNA   Anesthesiologist: Cecil Sharif MD  Resident/CRNA: DOTTIE Mccarthy - CRNA  Performed by: DOTTIE Mccarthy CRNA  Authorized by: Cecil Sharif MD    Spinal Block  Patient position: right lateral decubitus  Prep: ChloraPrep  Patient monitoring: cardiac monitor, continuous pulse ox, continuous capnometry, frequent blood pressure checks and oxygen  Approach: left paramedian  Location: L3/L4  Provider prep: mask and sterile gloves  Needle  Needle type: Quincke   Needle gauge: 22 G  Needle length: 3.5 in  Assessment  Sensory level: T6  Swirl obtained: Yes  CSF: clear  Attempts: 2  Hemodynamics: stable  Preanesthetic Checklist  Completed: patient identified, IV checked, site marked, risks and benefits discussed, surgical/procedural consents, equipment checked, pre-op evaluation, timeout performed, anesthesia consent given, oxygen available, monitors applied/VS acknowledged, fire risk safety assessment completed and verbalized and blood product R/B/A discussed and consented
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
## 2023-11-19 LAB
ALBUMIN SERPL-MCNC: 3.6 GM/DL (ref 3.4–5)
ALP BLD-CCNC: 84 IU/L (ref 40–128)
ALT SERPL-CCNC: 9 U/L (ref 10–40)
ANION GAP SERPL CALCULATED.3IONS-SCNC: 9 MMOL/L (ref 4–16)
AST SERPL-CCNC: 16 IU/L (ref 15–37)
BASOPHILS ABSOLUTE: 0 K/CU MM
BASOPHILS RELATIVE PERCENT: 0.2 % (ref 0–1)
BILIRUB SERPL-MCNC: 0.5 MG/DL (ref 0–1)
BUN SERPL-MCNC: 23 MG/DL (ref 6–23)
CALCIUM SERPL-MCNC: 8.4 MG/DL (ref 8.3–10.6)
CHLORIDE BLD-SCNC: 112 MMOL/L (ref 99–110)
CO2: 21 MMOL/L (ref 21–32)
CREAT SERPL-MCNC: 0.7 MG/DL (ref 0.6–1.1)
DIFFERENTIAL TYPE: ABNORMAL
EKG ATRIAL RATE: 80 BPM
EKG DIAGNOSIS: NORMAL
EKG P AXIS: 41 DEGREES
EKG P-R INTERVAL: 160 MS
EKG Q-T INTERVAL: 410 MS
EKG QRS DURATION: 76 MS
EKG QTC CALCULATION (BAZETT): 472 MS
EKG R AXIS: -12 DEGREES
EKG T AXIS: 16 DEGREES
EKG VENTRICULAR RATE: 80 BPM
EOSINOPHILS ABSOLUTE: 0 K/CU MM
EOSINOPHILS RELATIVE PERCENT: 0.2 % (ref 0–3)
GFR SERPL CREATININE-BSD FRML MDRD: >60 ML/MIN/1.73M2
GLUCOSE SERPL-MCNC: 137 MG/DL (ref 70–99)
HCT VFR BLD CALC: 29.9 % (ref 37–47)
HEMOGLOBIN: 9.4 GM/DL (ref 12.5–16)
IMMATURE NEUTROPHIL %: 1.1 % (ref 0–0.43)
LYMPHOCYTES ABSOLUTE: 1.4 K/CU MM
LYMPHOCYTES RELATIVE PERCENT: 8.7 % (ref 24–44)
MCH RBC QN AUTO: 30.2 PG (ref 27–31)
MCHC RBC AUTO-ENTMCNC: 31.4 % (ref 32–36)
MCV RBC AUTO: 96.1 FL (ref 78–100)
MONOCYTES ABSOLUTE: 0.8 K/CU MM
MONOCYTES RELATIVE PERCENT: 4.9 % (ref 0–4)
NUCLEATED RBC %: 0 %
PDW BLD-RTO: 13.2 % (ref 11.7–14.9)
PLATELET # BLD: 338 K/CU MM (ref 140–440)
PMV BLD AUTO: 8.8 FL (ref 7.5–11.1)
POTASSIUM SERPL-SCNC: 4.1 MMOL/L (ref 3.5–5.1)
RBC # BLD: 3.11 M/CU MM (ref 4.2–5.4)
SEGMENTED NEUTROPHILS ABSOLUTE COUNT: 13.8 K/CU MM
SEGMENTED NEUTROPHILS RELATIVE PERCENT: 84.9 % (ref 36–66)
SODIUM BLD-SCNC: 142 MMOL/L (ref 135–145)
TOTAL IMMATURE NEUTOROPHIL: 0.17 K/CU MM
TOTAL NUCLEATED RBC: 0 K/CU MM
TOTAL PROTEIN: 5.6 GM/DL (ref 6.4–8.2)
WBC # BLD: 16.2 K/CU MM (ref 4–10.5)

## 2023-11-19 PROCEDURE — 2700000000 HC OXYGEN THERAPY PER DAY

## 2023-11-19 PROCEDURE — 97163 PT EVAL HIGH COMPLEX 45 MIN: CPT

## 2023-11-19 PROCEDURE — 93010 ELECTROCARDIOGRAM REPORT: CPT | Performed by: INTERNAL MEDICINE

## 2023-11-19 PROCEDURE — 6360000002 HC RX W HCPCS: Performed by: ORTHOPAEDIC SURGERY

## 2023-11-19 PROCEDURE — 6370000000 HC RX 637 (ALT 250 FOR IP): Performed by: ORTHOPAEDIC SURGERY

## 2023-11-19 PROCEDURE — 97535 SELF CARE MNGMENT TRAINING: CPT

## 2023-11-19 PROCEDURE — 36415 COLL VENOUS BLD VENIPUNCTURE: CPT

## 2023-11-19 PROCEDURE — 2580000003 HC RX 258: Performed by: ORTHOPAEDIC SURGERY

## 2023-11-19 PROCEDURE — 97166 OT EVAL MOD COMPLEX 45 MIN: CPT

## 2023-11-19 PROCEDURE — 85025 COMPLETE CBC W/AUTO DIFF WBC: CPT

## 2023-11-19 PROCEDURE — 97116 GAIT TRAINING THERAPY: CPT

## 2023-11-19 PROCEDURE — 1200000000 HC SEMI PRIVATE

## 2023-11-19 PROCEDURE — 80053 COMPREHEN METABOLIC PANEL: CPT

## 2023-11-19 PROCEDURE — 94761 N-INVAS EAR/PLS OXIMETRY MLT: CPT

## 2023-11-19 RX ADMIN — SODIUM CHLORIDE, PRESERVATIVE FREE 10 ML: 5 INJECTION INTRAVENOUS at 20:10

## 2023-11-19 RX ADMIN — SALINE NASAL SPRAY 2 SPRAY: 1.5 SOLUTION NASAL at 09:45

## 2023-11-19 RX ADMIN — ACETAMINOPHEN 650 MG: 325 TABLET ORAL at 18:33

## 2023-11-19 RX ADMIN — SALINE NASAL SPRAY 2 SPRAY: 1.5 SOLUTION NASAL at 14:08

## 2023-11-19 RX ADMIN — KETOROLAC TROMETHAMINE 15 MG: 30 INJECTION, SOLUTION INTRAMUSCULAR; INTRAVENOUS at 20:09

## 2023-11-19 RX ADMIN — ATORVASTATIN CALCIUM 10 MG: 10 TABLET, FILM COATED ORAL at 20:10

## 2023-11-19 RX ADMIN — SALINE NASAL SPRAY 2 SPRAY: 1.5 SOLUTION NASAL at 18:35

## 2023-11-19 RX ADMIN — KETOROLAC TROMETHAMINE 15 MG: 30 INJECTION, SOLUTION INTRAMUSCULAR; INTRAVENOUS at 05:17

## 2023-11-19 RX ADMIN — SERTRALINE HYDROCHLORIDE 100 MG: 50 TABLET ORAL at 20:10

## 2023-11-19 RX ADMIN — LEVOTHYROXINE SODIUM 137 MCG: 25 TABLET ORAL at 05:18

## 2023-11-19 RX ADMIN — SODIUM CHLORIDE, PRESERVATIVE FREE 10 ML: 5 INJECTION INTRAVENOUS at 09:29

## 2023-11-19 ASSESSMENT — PAIN DESCRIPTION - LOCATION
LOCATION: HIP;ARM
LOCATION: HEAD;ARM
LOCATION: HEAD;ARM;HIP

## 2023-11-19 ASSESSMENT — PAIN DESCRIPTION - ORIENTATION
ORIENTATION: RIGHT;MID
ORIENTATION: LEFT
ORIENTATION: LEFT

## 2023-11-19 ASSESSMENT — PAIN SCALES - GENERAL
PAINLEVEL_OUTOF10: 8
PAINLEVEL_OUTOF10: 8
PAINLEVEL_OUTOF10: 0
PAINLEVEL_OUTOF10: 8

## 2023-11-19 ASSESSMENT — PAIN DESCRIPTION - DESCRIPTORS
DESCRIPTORS: ACHING
DESCRIPTORS: SHARP
DESCRIPTORS: ACHING;DISCOMFORT

## 2023-11-19 ASSESSMENT — PAIN DESCRIPTION - ONSET: ONSET: ON-GOING

## 2023-11-19 ASSESSMENT — PAIN - FUNCTIONAL ASSESSMENT
PAIN_FUNCTIONAL_ASSESSMENT: ACTIVITIES ARE NOT PREVENTED
PAIN_FUNCTIONAL_ASSESSMENT: PREVENTS OR INTERFERES SOME ACTIVE ACTIVITIES AND ADLS
PAIN_FUNCTIONAL_ASSESSMENT: PREVENTS OR INTERFERES SOME ACTIVE ACTIVITIES AND ADLS

## 2023-11-19 ASSESSMENT — PAIN DESCRIPTION - PAIN TYPE: TYPE: SURGICAL PAIN;ACUTE PAIN

## 2023-11-19 ASSESSMENT — PAIN DESCRIPTION - FREQUENCY: FREQUENCY: INTERMITTENT

## 2023-11-19 NOTE — PLAN OF CARE
Problem: Discharge Planning  Goal: Discharge to home or other facility with appropriate resources  Outcome: Progressing     Problem: Pain  Goal: Verbalizes/displays adequate comfort level or baseline comfort level  Outcome: Progressing  Flowsheets (Taken 11/18/2023 0915 by Scotty Currie LPN)  Verbalizes/displays adequate comfort level or baseline comfort level: Encourage patient to monitor pain and request assistance     Problem: Safety - Adult  Goal: Free from fall injury  Outcome: Progressing     Problem: ABCDS Injury Assessment  Goal: Absence of physical injury  Outcome: Progressing     Problem: Skin/Tissue Integrity  Goal: Absence of new skin breakdown  Description: 1. Monitor for areas of redness and/or skin breakdown  2. Assess vascular access sites hourly  3. Every 4-6 hours minimum:  Change oxygen saturation probe site  4. Every 4-6 hours:  If on nasal continuous positive airway pressure, respiratory therapy assess nares and determine need for appliance change or resting period.   Outcome: Progressing

## 2023-11-19 NOTE — PROGRESS NOTES
V2.0    [facility] Progress Note      Name:  Zuleyma Moreno /Age/Sex: 1950  (67 y.o. female)   MRN & CSN:  [id number] & [id number] Encounter Date/Time: 2023 12:45 PM EST   Location:  [address] [place] [address]-A PCP: MARCO A Ramirez     Attending:Alejandrina Johnston, [address] [place] [address] Day: 3    Assessment and Recommendations   Zuleyma Moreno is a 67 y.o. female who presents with Hip fracture requiring operative repair, left, closed, initial encounter ([address] [facility])      Plan:     Acute comminuted nondisplaced left greater trochanteric fracture  X-ray of the left hip-no acute osseous abnormality  CT hip-revealed left hip fracture, questionable intertrochanteric extension. MRI of the left hip done which reported that the fracture extends into the intertrochanteric region. Greater trochanter is displaced.  -Orthopedic surgery-Dr. Elroy Small consulted from ED.  -s/p IV fluids, antiemetics, analgesics as needed  -Neurovascular checks  -Patient stated that opioids make her sick to stomach and is refusing  -Ordered Toradol for pain control. S/p-  trochanteric nail fixation 23  Pending PT/OT evaluation      Acute comminuted minimally displaced intra-articular fracture of the left proximal humerus  In a sling     Non-anion gap metabolic acidosis - resolved post IVF    Leukocytosis  could be reactive post surgery   Monitor with repeat CBC     Anemia  Hemoglobin 11.5, monitor H&H     Multiple falls recently  -Patient had a fall -in grass fields, reported that the grass was wet and she slipped and fell on the road-had laceration to the bridge of nose. Patient was evaluated in ER-CT head, CT C-spine-no acute abnormality  -Fall 11/10/2023-patient was evaluated in the ER-imaging did not reveal any acute fractures and was discharged  -PT/OT evaluation when appropriate     #. Hypothyroidism/history of Graves' disease  -Patient had thyroidectomy, on levothyroxine 137 mcg daily     #.   Depression/anxiety  -Patient is on CONTRAST    Result Date: 11/17/2023  EXAMINATION: CT OF THE LEFT HIP WITHOUT CONTRAST 11/17/2023 1:42 pm TECHNIQUE: CT of the left hip was performed without the administration of intravenous contrast.  Multiplanar reformatted images are provided for review. Automated exposure control, iterative reconstruction, and/or weight based adjustment of the mA/kV was utilized to reduce the radiation dose to as low as reasonably achievable. COMPARISON: Same day hip radiographs HISTORY ORDERING SYSTEM PROVIDED HISTORY: continued pain. possible Fx seen on xray TECHNOLOGIST PROVIDED HISTORY: Reason for exam:->continued pain. possible Fx seen on xray Decision Support Exception - unselect if not a suspected or confirmed emergency medical condition->Emergency Medical Condition (MA) Reason for Exam: fall, continued pain. Additional signs and symptoms: fell 11-, lt hip pain since with an increase last couple days, trouble bearing wt FINDINGS: Bones: Diffuse demineralization. Comminuted, nondisplaced left greater trochanteric fracture. Questionable intertrochanteric extension (coronal image 41). Soft Tissue: Contusion overlying the left hip. Mild vascular calcifications. Joint: No significant degenerative changes. No osseous erosions. Comminuted, nondisplaced left greater trochanteric fracture. Questionable intertrochanteric extension. XR SHOULDER LEFT (MIN 2 VIEWS)    Result Date: 11/17/2023  EXAMINATION: 3 X-RAY VIEWS OF THE LEFT SHOULDER; THREE X-RAY VIEWS OF THE LEFT ELBOW 11/17/2023 11:38 am COMPARISON: None HISTORY: ORDERING SYSTEM PROVIDED HISTORY:  Please include Y view. Fall 2 days ago. TECHNOLOGIST PROVIDED HISTORY: Reason for exam:  Please include Y view. Fall 2 days ago. FINDINGS: Bone mineral density is significantly decreased. Left Shoulder: There is an acute, comminuted, minimally displaced intra-articular fracture of the proximal humerus. Joint alignment is maintained.   Advanced glenohumeral

## 2023-11-19 NOTE — PROGRESS NOTES
Occupational Therapy  [facility] ACUTE CARE OCCUPATIONAL THERAPY EVALUATION    Elaine Officer, 1950, 1120/1120-A, 11/19/2023    Discharge Recommendation:  Inpatient Rehabilitation       History:  [place]:  There were no encounter diagnoses.   Past Medical History:   Diagnosis Date    Hyperlipidemia        Subjective:  Patient states: \"I'm so ready to get out of this bed\"  Pain: L hip pain w/ mobility, did not rate  Communication with other providers: co-eval w/ PT, handoff to RN  Restrictions: General Precautions, Fall Risk, L UE NWB -  remain in sling, L LE WBAT    Home Setup/Prior level of function:  Social/Functional History  Lives With: Spouse  Type of Home: House  Home Layout: One level  Home Access: Stairs to enter without rails  Entrance Stairs - Number of Steps: 1  Bathroom Shower/Tub: Walk-in shower  Bathroom Toilet: Standard  Bathroom Equipment: Shower chair  Home Equipment: Carolynne Felty  Has the patient had two or more falls in the past year or any fall with injury in the past year?: Yes  ADL Assistance: Independent  Homemaking Assistance: Independent  Ambulation Assistance: Independent  Transfer Assistance: Independent  Active : Yes     Examination:  Observation: Supine in bed upon arrival, agreeable to therapy eval.  Vision: Wears glasses  Hearing: WFL  Vitals: Stable vitals throughout session on supplemental O2      Body Systems and functions:  ROM: WFL   Strength: B UE grossly 4/5 across all major joints   Sensation: WFL  Tone: Normal  Coordination: WFL  Perception: WNL      Cognitive and Psychosocial Functioning:  Overall cognitive status: alert and oriented, WFL  Affect: Normal       Functional Mobility:  Bed mobility:  supine to sitting EOB w/ mod Ax2  Sitting balance:  SBA    Transfers: STS to/from EOB w/ CGA  Standing balance:  CGA w/ quad cane  Functional Mobility: ambulated short functional distance using quad cane w/ CGA - min A, Vcs for sequencing and quad cane meals and up to bathroom for all toileting needs     Assessment:  Pt is a 67 y o F admitted d/t fall w/ L hip fx. Underwent L hip IM nail eula insertion 11/18/23. Pt at baseline is IND for ADLs, IND for high level IADLs, and IND for functional transfers/mobility w/o AD. Pt currently presents w/ deficits in ADL and high level IADL independence, functional ADL transfers, strength, and functional activity tolerance. Continued OT services recommended to increase safety and independence with ADL routine and to address remaining functional deficits. Pt would benefit from continued acute care OT services w/ discharge to ARU. Complexity: Moderate  Prognosis: Good, no significant barriers to participation at this time. Occupational Therapy Plan  Times Per Week: 3+       Goals:  Pt will complete all aspects of bed mobility for EOB/OOB ADLs w/ SBA. Pt will complete UB ADLs w/ SBA. Pt will complete LB ADLs w/ SBA. Pt will complete all functional transfers to and from bed, chair, toilet, shower chair w/ SBA. Pt will ambulate functional household distance w/ SBA. Pt will complete all aspects of toileting task w/ SBA. Pt will perform therex/theract in order to increase strength and functional activity tolerance necessary for increased independence w/ ADL routine.     Pt goal: go home, get stronger  Time Frame for STGs: discharge    Equipment: Continue to assess at next LOC    Time:   Time in: 1219  Time out: 1242  Total time: 23  Timed treatment minutes: 13        Electronically signed by:      Yevonne Moritz, OTR/L  [id number]

## 2023-11-19 NOTE — FLOWSHEET NOTE
[facility] RN rounded on pt for DI scor of 53. She is in bed awake, AxO x4. Left arm in sling. She states she had a rough night but doing better now. She has No C/O pain. Pt repositioned in bed. Water refilled for pt. No other needs at this Time.

## 2023-11-20 ENCOUNTER — HOSPITAL ENCOUNTER (INPATIENT)
Age: 73
DRG: 481 | End: 2023-11-20
Attending: PHYSICAL MEDICINE & REHABILITATION | Admitting: PHYSICAL MEDICINE & REHABILITATION
Payer: MEDICARE

## 2023-11-20 VITALS
HEART RATE: 93 BPM | DIASTOLIC BLOOD PRESSURE: 70 MMHG | BODY MASS INDEX: 27.54 KG/M2 | RESPIRATION RATE: 23 BRPM | HEIGHT: 63 IN | TEMPERATURE: 99 F | SYSTOLIC BLOOD PRESSURE: 146 MMHG | OXYGEN SATURATION: 90 % | WEIGHT: 155.42 LBS

## 2023-11-20 PROBLEM — S72.115A NONDISPLACED FRACTURE OF GREATER TROCHANTER OF LEFT FEMUR, INITIAL ENCOUNTER FOR CLOSED FRACTURE (HCC): Status: ACTIVE | Noted: 2023-11-20

## 2023-11-20 LAB
ALBUMIN SERPL-MCNC: 3.7 GM/DL (ref 3.4–5)
ALP BLD-CCNC: 86 IU/L (ref 40–129)
ALT SERPL-CCNC: 8 U/L (ref 10–40)
ANION GAP SERPL CALCULATED.3IONS-SCNC: 10 MMOL/L (ref 4–16)
AST SERPL-CCNC: 13 IU/L (ref 15–37)
BASOPHILS ABSOLUTE: 0.1 K/CU MM
BASOPHILS RELATIVE PERCENT: 0.8 % (ref 0–1)
BILIRUB SERPL-MCNC: 0.6 MG/DL (ref 0–1)
BUN SERPL-MCNC: 21 MG/DL (ref 6–23)
CALCIUM SERPL-MCNC: 8.2 MG/DL (ref 8.3–10.6)
CHLORIDE BLD-SCNC: 113 MMOL/L (ref 99–110)
CO2: 22 MMOL/L (ref 21–32)
CREAT SERPL-MCNC: 0.5 MG/DL (ref 0.6–1.1)
DIFFERENTIAL TYPE: ABNORMAL
EOSINOPHILS ABSOLUTE: 0.5 K/CU MM
EOSINOPHILS RELATIVE PERCENT: 4.1 % (ref 0–3)
GFR SERPL CREATININE-BSD FRML MDRD: >60 ML/MIN/1.73M2
GLUCOSE SERPL-MCNC: 108 MG/DL (ref 70–99)
HCT VFR BLD CALC: 31 % (ref 37–47)
HEMOGLOBIN: 9.9 GM/DL (ref 12.5–16)
IMMATURE NEUTROPHIL %: 1.7 % (ref 0–0.43)
LYMPHOCYTES ABSOLUTE: 2.5 K/CU MM
LYMPHOCYTES RELATIVE PERCENT: 21 % (ref 24–44)
MCH RBC QN AUTO: 30.8 PG (ref 27–31)
MCHC RBC AUTO-ENTMCNC: 31.9 % (ref 32–36)
MCV RBC AUTO: 96.6 FL (ref 78–100)
MONOCYTES ABSOLUTE: 0.5 K/CU MM
MONOCYTES RELATIVE PERCENT: 4.1 % (ref 0–4)
NUCLEATED RBC %: 0 %
PDW BLD-RTO: 13.4 % (ref 11.7–14.9)
PLATELET # BLD: 341 K/CU MM (ref 140–440)
PMV BLD AUTO: 8.8 FL (ref 7.5–11.1)
POTASSIUM SERPL-SCNC: 4.5 MMOL/L (ref 3.5–5.1)
RBC # BLD: 3.21 M/CU MM (ref 4.2–5.4)
SEGMENTED NEUTROPHILS ABSOLUTE COUNT: 8 K/CU MM
SEGMENTED NEUTROPHILS RELATIVE PERCENT: 68.3 % (ref 36–66)
SODIUM BLD-SCNC: 145 MMOL/L (ref 135–145)
TOTAL IMMATURE NEUTOROPHIL: 0.2 K/CU MM
TOTAL NUCLEATED RBC: 0 K/CU MM
TOTAL PROTEIN: 5.7 GM/DL (ref 6.4–8.2)
WBC # BLD: 11.7 K/CU MM (ref 4–10.5)

## 2023-11-20 PROCEDURE — 6360000002 HC RX W HCPCS: Performed by: STUDENT IN AN ORGANIZED HEALTH CARE EDUCATION/TRAINING PROGRAM

## 2023-11-20 PROCEDURE — 85025 COMPLETE CBC W/AUTO DIFF WBC: CPT

## 2023-11-20 PROCEDURE — 6370000000 HC RX 637 (ALT 250 FOR IP): Performed by: STUDENT IN AN ORGANIZED HEALTH CARE EDUCATION/TRAINING PROGRAM

## 2023-11-20 PROCEDURE — 99223 1ST HOSP IP/OBS HIGH 75: CPT | Performed by: PHYSICAL MEDICINE & REHABILITATION

## 2023-11-20 PROCEDURE — 6370000000 HC RX 637 (ALT 250 FOR IP): Performed by: ORTHOPAEDIC SURGERY

## 2023-11-20 PROCEDURE — 6360000002 HC RX W HCPCS: Performed by: INTERNAL MEDICINE

## 2023-11-20 PROCEDURE — 94761 N-INVAS EAR/PLS OXIMETRY MLT: CPT

## 2023-11-20 PROCEDURE — 97116 GAIT TRAINING THERAPY: CPT

## 2023-11-20 PROCEDURE — 97530 THERAPEUTIC ACTIVITIES: CPT

## 2023-11-20 PROCEDURE — 80053 COMPREHEN METABOLIC PANEL: CPT

## 2023-11-20 PROCEDURE — 2700000000 HC OXYGEN THERAPY PER DAY

## 2023-11-20 PROCEDURE — 1280000000 HC REHAB R&B

## 2023-11-20 PROCEDURE — 2580000003 HC RX 258: Performed by: ORTHOPAEDIC SURGERY

## 2023-11-20 PROCEDURE — 6360000002 HC RX W HCPCS: Performed by: ORTHOPAEDIC SURGERY

## 2023-11-20 PROCEDURE — 36415 COLL VENOUS BLD VENIPUNCTURE: CPT

## 2023-11-20 RX ORDER — ONDANSETRON 4 MG/1
4 TABLET, ORALLY DISINTEGRATING ORAL EVERY 8 HOURS PRN
Status: DISCONTINUED | OUTPATIENT
Start: 2023-11-20 | End: 2023-11-29 | Stop reason: HOSPADM

## 2023-11-20 RX ORDER — FLUTICASONE PROPIONATE 50 MCG
1 SPRAY, SUSPENSION (ML) NASAL 2 TIMES DAILY
Status: DISCONTINUED | OUTPATIENT
Start: 2023-11-20 | End: 2023-11-20 | Stop reason: HOSPADM

## 2023-11-20 RX ORDER — FLUTICASONE PROPIONATE 50 MCG
1 SPRAY, SUSPENSION (ML) NASAL 2 TIMES DAILY
Status: DISCONTINUED | OUTPATIENT
Start: 2023-11-20 | End: 2023-11-29 | Stop reason: HOSPADM

## 2023-11-20 RX ORDER — FLUTICASONE PROPIONATE 50 MCG
1 SPRAY, SUSPENSION (ML) NASAL 2 TIMES DAILY
Status: CANCELLED | OUTPATIENT
Start: 2023-11-20

## 2023-11-20 RX ORDER — PANTOPRAZOLE SODIUM 40 MG/1
40 TABLET, DELAYED RELEASE ORAL
Status: CANCELLED | OUTPATIENT
Start: 2023-11-21

## 2023-11-20 RX ORDER — PANTOPRAZOLE SODIUM 40 MG/1
40 TABLET, DELAYED RELEASE ORAL
Status: DISCONTINUED | OUTPATIENT
Start: 2023-11-20 | End: 2023-11-20 | Stop reason: HOSPADM

## 2023-11-20 RX ORDER — ACETAMINOPHEN 325 MG/1
650 TABLET ORAL EVERY 6 HOURS PRN
Status: DISCONTINUED | OUTPATIENT
Start: 2023-11-20 | End: 2023-11-21

## 2023-11-20 RX ORDER — SODIUM CHLORIDE 9 MG/ML
INJECTION, SOLUTION INTRAVENOUS PRN
Status: CANCELLED | OUTPATIENT
Start: 2023-11-20

## 2023-11-20 RX ORDER — ENOXAPARIN SODIUM 100 MG/ML
40 INJECTION SUBCUTANEOUS DAILY
Status: DISCONTINUED | OUTPATIENT
Start: 2023-11-21 | End: 2023-11-29 | Stop reason: HOSPADM

## 2023-11-20 RX ORDER — ONDANSETRON 4 MG/1
4 TABLET, ORALLY DISINTEGRATING ORAL EVERY 8 HOURS PRN
Status: CANCELLED | OUTPATIENT
Start: 2023-11-20

## 2023-11-20 RX ORDER — ENOXAPARIN SODIUM 100 MG/ML
40 INJECTION SUBCUTANEOUS DAILY
Status: CANCELLED | OUTPATIENT
Start: 2023-11-21

## 2023-11-20 RX ORDER — BISACODYL 10 MG
10 SUPPOSITORY, RECTAL RECTAL DAILY PRN
Status: DISCONTINUED | OUTPATIENT
Start: 2023-11-20 | End: 2023-11-29 | Stop reason: HOSPADM

## 2023-11-20 RX ORDER — ATORVASTATIN CALCIUM 10 MG/1
10 TABLET, FILM COATED ORAL NIGHTLY
Status: DISCONTINUED | OUTPATIENT
Start: 2023-11-20 | End: 2023-11-29 | Stop reason: HOSPADM

## 2023-11-20 RX ORDER — ACETAMINOPHEN 325 MG/1
650 TABLET ORAL EVERY 6 HOURS PRN
Status: CANCELLED | OUTPATIENT
Start: 2023-11-20

## 2023-11-20 RX ORDER — POLYETHYLENE GLYCOL 3350 17 G/17G
17 POWDER, FOR SOLUTION ORAL DAILY PRN
Status: CANCELLED | OUTPATIENT
Start: 2023-11-20

## 2023-11-20 RX ORDER — ATORVASTATIN CALCIUM 10 MG/1
10 TABLET, FILM COATED ORAL NIGHTLY
Status: CANCELLED | OUTPATIENT
Start: 2023-11-20

## 2023-11-20 RX ORDER — ONDANSETRON 2 MG/ML
4 INJECTION INTRAMUSCULAR; INTRAVENOUS EVERY 6 HOURS PRN
Status: CANCELLED | OUTPATIENT
Start: 2023-11-20

## 2023-11-20 RX ORDER — KETOROLAC TROMETHAMINE 30 MG/ML
15 INJECTION, SOLUTION INTRAMUSCULAR; INTRAVENOUS EVERY 8 HOURS PRN
Status: DISPENSED | OUTPATIENT
Start: 2023-11-20 | End: 2023-11-22

## 2023-11-20 RX ORDER — SODIUM CHLORIDE 9 MG/ML
INJECTION, SOLUTION INTRAVENOUS PRN
Status: DISCONTINUED | OUTPATIENT
Start: 2023-11-20 | End: 2023-11-25

## 2023-11-20 RX ORDER — ACETAMINOPHEN 650 MG/1
650 SUPPOSITORY RECTAL EVERY 6 HOURS PRN
Status: CANCELLED | OUTPATIENT
Start: 2023-11-20

## 2023-11-20 RX ORDER — ONDANSETRON 2 MG/ML
4 INJECTION INTRAMUSCULAR; INTRAVENOUS EVERY 6 HOURS PRN
Status: DISCONTINUED | OUTPATIENT
Start: 2023-11-20 | End: 2023-11-28

## 2023-11-20 RX ORDER — KETOROLAC TROMETHAMINE 30 MG/ML
15 INJECTION, SOLUTION INTRAMUSCULAR; INTRAVENOUS EVERY 8 HOURS PRN
Status: CANCELLED | OUTPATIENT
Start: 2023-11-20 | End: 2023-11-22

## 2023-11-20 RX ORDER — ACETAMINOPHEN 325 MG/1
650 TABLET ORAL EVERY 4 HOURS PRN
Status: CANCELLED | OUTPATIENT
Start: 2023-11-20

## 2023-11-20 RX ORDER — ACETAMINOPHEN 650 MG/1
650 SUPPOSITORY RECTAL EVERY 6 HOURS PRN
Status: DISCONTINUED | OUTPATIENT
Start: 2023-11-20 | End: 2023-11-21

## 2023-11-20 RX ORDER — POLYETHYLENE GLYCOL 3350 17 G/17G
17 POWDER, FOR SOLUTION ORAL DAILY PRN
Status: DISCONTINUED | OUTPATIENT
Start: 2023-11-20 | End: 2023-11-29 | Stop reason: HOSPADM

## 2023-11-20 RX ORDER — PANTOPRAZOLE SODIUM 40 MG/1
40 TABLET, DELAYED RELEASE ORAL
Status: DISCONTINUED | OUTPATIENT
Start: 2023-11-21 | End: 2023-11-29 | Stop reason: HOSPADM

## 2023-11-20 RX ORDER — BISACODYL 10 MG
10 SUPPOSITORY, RECTAL RECTAL DAILY PRN
Status: CANCELLED | OUTPATIENT
Start: 2023-11-20

## 2023-11-20 RX ORDER — ACETAMINOPHEN 325 MG/1
650 TABLET ORAL EVERY 4 HOURS PRN
Status: DISCONTINUED | OUTPATIENT
Start: 2023-11-20 | End: 2023-11-21

## 2023-11-20 RX ORDER — KETOROLAC TROMETHAMINE 30 MG/ML
15 INJECTION, SOLUTION INTRAMUSCULAR; INTRAVENOUS EVERY 8 HOURS PRN
Status: DISCONTINUED | OUTPATIENT
Start: 2023-11-20 | End: 2023-11-20 | Stop reason: HOSPADM

## 2023-11-20 RX ADMIN — KETOROLAC TROMETHAMINE 15 MG: 30 INJECTION, SOLUTION INTRAMUSCULAR; INTRAVENOUS at 15:41

## 2023-11-20 RX ADMIN — SERTRALINE HYDROCHLORIDE 100 MG: 50 TABLET ORAL at 22:26

## 2023-11-20 RX ADMIN — ACETAMINOPHEN 650 MG: 325 TABLET ORAL at 14:17

## 2023-11-20 RX ADMIN — FLUTICASONE PROPIONATE 1 SPRAY: 50 SPRAY, METERED NASAL at 14:18

## 2023-11-20 RX ADMIN — KETOROLAC TROMETHAMINE 15 MG: 30 INJECTION, SOLUTION INTRAMUSCULAR; INTRAVENOUS at 09:12

## 2023-11-20 RX ADMIN — FLUTICASONE PROPIONATE 1 SPRAY: 50 SPRAY, METERED NASAL at 22:28

## 2023-11-20 RX ADMIN — PANTOPRAZOLE SODIUM 40 MG: 40 TABLET, DELAYED RELEASE ORAL at 14:17

## 2023-11-20 RX ADMIN — LEVOTHYROXINE SODIUM 137 MCG: 25 TABLET ORAL at 05:57

## 2023-11-20 RX ADMIN — ATORVASTATIN CALCIUM 10 MG: 10 TABLET, FILM COATED ORAL at 22:26

## 2023-11-20 RX ADMIN — SODIUM CHLORIDE, PRESERVATIVE FREE 10 ML: 5 INJECTION INTRAVENOUS at 09:12

## 2023-11-20 RX ADMIN — ACETAMINOPHEN 650 MG: 325 TABLET ORAL at 05:57

## 2023-11-20 RX ADMIN — ENOXAPARIN SODIUM 40 MG: 100 INJECTION SUBCUTANEOUS at 09:12

## 2023-11-20 ASSESSMENT — PAIN DESCRIPTION - LOCATION
LOCATION: ARM;LEG
LOCATION: ARM
LOCATION: LEG
LOCATION: HIP;KNEE

## 2023-11-20 ASSESSMENT — PAIN DESCRIPTION - DESCRIPTORS
DESCRIPTORS: ACHING

## 2023-11-20 ASSESSMENT — PAIN DESCRIPTION - ORIENTATION
ORIENTATION: LEFT

## 2023-11-20 ASSESSMENT — PAIN SCALES - GENERAL
PAINLEVEL_OUTOF10: 4
PAINLEVEL_OUTOF10: 4
PAINLEVEL_OUTOF10: 8
PAINLEVEL_OUTOF10: 0
PAINLEVEL_OUTOF10: 2
PAINLEVEL_OUTOF10: 0
PAINLEVEL_OUTOF10: 4

## 2023-11-20 ASSESSMENT — PAIN - FUNCTIONAL ASSESSMENT
PAIN_FUNCTIONAL_ASSESSMENT: ACTIVITIES ARE NOT PREVENTED
PAIN_FUNCTIONAL_ASSESSMENT: PREVENTS OR INTERFERES SOME ACTIVE ACTIVITIES AND ADLS
PAIN_FUNCTIONAL_ASSESSMENT: ACTIVITIES ARE NOT PREVENTED
PAIN_FUNCTIONAL_ASSESSMENT: ACTIVITIES ARE NOT PREVENTED

## 2023-11-20 ASSESSMENT — PAIN DESCRIPTION - ONSET: ONSET: ON-GOING

## 2023-11-20 ASSESSMENT — PAIN DESCRIPTION - PAIN TYPE: TYPE: ACUTE PAIN

## 2023-11-20 ASSESSMENT — PAIN DESCRIPTION - FREQUENCY: FREQUENCY: INTERMITTENT

## 2023-11-20 NOTE — PROGRESS NOTES
Occupational Therapy    Occupational Therapy Treatment Note    Name: Alan Medina MRN: [id number] :   1950   Date:  2023   Admission Date: 2023 Room:  [address] [place] [address]-     Primary Problem: Lt hip fracture s/p IM nail eula insertion and Lt proximal humerus fracture    Restrictions/Precautions: General Precautions, Fall Risk, WBAT Lt LE, NWB Lt UE with sling, Bed exit alarm    Communication with other providers: BOYD Archuleta    Subjective:  Patient states: \"I'm happy they approved me for rehab! \"   Pain:  Pt reported 4/10 surgical pain in Lt hip    Objective:    Observation: Pt received standing from [facility] with RN present. Pt agreeable to OT treatment. Objective Measures: A & O x 4    Treatment, including education:  Therapeutic Activity Training:   Therapeutic activity training was instructed today. Cues were given for safety, sequence, UE/LE placement, awareness, and balance. Pt received standing from [facility] with RN present. Agreeable to treatment and requesting to ambulate. Pt ambulated ~75 ft CGA with quad cane in Rt hand. Pt with slight antalgia but sequenced steps well with cane and had no episodes of LOB. Pt returned to room and transferred stand to sit to bed SBA with cues for reaching back with Rt arm. Pt declined to complete any further ADLs this date. Pt returned from sitting EOB to supine SBA with increased time/effort. Pt educated to check with surgeon on any Lt UE ROM restrictions, as no guidelines currently found in chart. Pt left positioned for comfort in bed with all lines intact, all needs within reach, and bed alarm on. Assessment / Impression:    Patient's tolerance of treatment: Well  Adverse Reaction: None  Significant change in status and impact: Improved from initial evaluation  Barriers to improvement: None noted    Plan for Next Session:    Continue per OT POC. Pt approved for inpatient rehab per charting.     Time in: 1550  Time out: 1601  Timed treatment minutes: 11  Total treatment time: 11    Electronically signed by:    TRAN Ochoa, [address] [place] [phone]

## 2023-11-20 NOTE — PROGRESS NOTES
Physical Therapy  Name: Guillaume Abad MRN: [id number] :   1950   Date:  2023   Admission Date: 2023 Room:  [address] [place], [place] [address]A   Restrictions/Precautions:  Restrictions/Precautions  Restrictions/Precautions: General Precautions, Fall Risk, Weight Bearing Lower Extremity Weight Bearing Restrictions  Left Lower Extremity Weight Bearing: Weight Bearing As Tolerated   , L UE NWB -  remain in sling    Communication with other providers:  Appropriate via chart review    Subjective:  Patient states:  \"I didn't think I would have this many visitors\"   Pain:   Location, Type, Intensity (0/10 to 10/10):  c/o pain in LLE, does not rate on scale    Objective:    Observation:  Patient is supine in bed upon     Vitals : Patient is on room air upon arrival      Treatment, including education/measures:    Transfers with line management of none  Supine to sit :Min A, with HOB elevate. Assist for trunk management  Seated balance: Patient requires SBA for static sitting  Scooting :Seated scooting SBA  Sit to stand :CGA from EOB and raised toilet  Stand to sit :CGA to recliner and raised toilet. Patient needs cues for BUE effort for eccentric control  SPT:CGA with LBQC, slow pacing and sequence    Gait:  Pt amb with LBQC for 100 ft with CGA assist. She demo's rounded shoulders posture, step through gait pattern, and decrease step length and height with Rt trendelenburg gait   Pt needed VC's for increase step length and height    Stair training  Patient ascends and descends 2, 6 inch steps at CGA with LBQC and x 1 UE on grab rails. Cues for step to sequence. Exercises  Sitting Exercises: Ankle pumps x 10  LAQ's x 10  Marching x 10       Therapeutic exercises were instructed today. Cues were given for technique, safety, recruitment, and rationale. Cues were verbal and/or tactile. Safety  Patient left safely in the recliner, with call light/phone in reach with alarm applied.  Gait belt and mask were used for

## 2023-11-20 NOTE — FLOWSHEET NOTE
ARU Admission Report    [x] Approved in Relay (Time entered NA)  [x] Printed 2 copies of PAS  [x] Bed has been reserved  [x] Dr Wesley Rubio has been Notified of Patient Admission   [x] Signed and Held Orders are in chart  [x] Hospitalist has completed discharge order    Room transferring from [facility]: 1028/1028-A         Room assigned on ARU:  1028     Report received from : Dai Hernandez  2023      6:57 PM    NAME: Michele Oliva   : 1950  AGE: 75 y.o. Code Status:   Advance Directives       Power of  Living Will ACP-Advance Directive ACP-Power of     Not on File Not on File Not on File Not on File          Full Code    Dx: Closed left hip fracture ([address]) [S72.002A]  Nondisplaced fracture of greater trochanter of left femur, initial encounter for closed fracture ([address]) Omaira Childs  ICD Code:    Isolation Status: There are no current isolations documented for this patient. Allergies: Allergies   Allergen Reactions    Tetracyclines & Related Other (See Comments)     Ears turned purple and swelled up     Medical Hx:   Past Medical History:   Diagnosis Date    Hyperlipidemia         Past Surgical History:   Procedure Laterality Date    APPENDECTOMY      CATARACT REMOVAL      CHOLECYSTECTOMY      COLONOSCOPY      EYE SURGERY      HIP SURGERY Left 2023    HIP IM NAIL BRIE INSERTION performed by Ana Ruiz MD at [address], TOTAL ABDOMINAL (CERVIX REMOVED)      TOTAL THYROIDECTOMY         Background: From home with , fell going into ER in [facility], fx L hip post op day 2 with ILEANA Madera humerus (non-operative.)    Precautions:          Diet: ADULT DIET;  Regular  Accuchecks: N/A  Labs:   Lab Results   Component Value Date    WBC 11.7 (H) 2023    HGB 9.9 (L) 2023    HCT 31.0 (L) 2023    MCV 96.6 2023     2023     Recent Labs     23  1937 23  0229 23  0149 23  0302   BUN  21 (Blair Schulte)  [] Plan of Care has been opened ([id number])   [] IRF-CHUNG Patient QI scoring has been completed for all tasks done with pt    Signed:  Date:  Time:

## 2023-11-20 NOTE — FLOWSHEET NOTE
ARU Admission Assessment - [facility]      A Complete drug regimen review was completed for this patient this date. [x]  No clinically significant medication issue was identified   []  Yes, a clinically significant medication issue was identified     []  Adverse Drug Event:    []  Allergy:    []  Side Effect:    []  Ineffective Therapy:    []  Drug interaction:     []  Duplicate Therapy:    []  Untreated Indication:    []  Non-adherence:    []  Other:  Nursing contacted the physician:       Date:                Time:    Actions recommended by physician were completed:   Date:                 Time:  Action(s) Taken:             []  New Physician Order Received    []  Issue Noted by Physician; However No Action Required    []  Other:        Ethnicity  \"Are you of , /a, or Yakut origin? \"  Check all that apply:  [x] A. No, not of , /a, or [place] Origin  [] B.  Yes, [place] American, Chicano/a  [] C.  Yes, [address] [place] [address]  [] D.  Yes, [place]  [] E.  Yes, another , , or Yakut origin  [] X. Patient unable to respond    Race  \"What is your race? \"  Check all that apply:  [x] A. White  [] B. Black or   [] C. American [place] or [place] Native  [] D.  [place]  [] E. [place]  [] F. Venezuelan  [] G. [place]  [] Nichrote Kenneth  [] I. [place]  [] J.  Other   [] K.   [] L. South African or [place]  [] M. Surinamese  [] N. Other -[address] [place] [address]  [] X. Patient unable to respond    Language  A. \"What is your preferred language? \"   English      B. \"Do you need or want an  to communicate with a doctor or health care staff? \"  Check only one:  [x] 0. No  [] 1. Yes  [] 9. Unable to determine    Transportation  \"In the past 6-12 months, has lack of transportation kept you from medical appointments, meetings, work, or from getting things needed for daily living? \"  Check all that apply:  [] A.  Yes, it has kept me from isolated from those around you? \"  [x] 0. Never  [] 1. Rarely  [] 2. Sometimes  [] 3. Often  [] 4. Always  [] 8. Patient unable to respond    Pain Effect on Sleep  \"Over the past 5 days, how much of the time has pain made it hard for you to sleep at night? \"  []  0. Does not apply - I have not had any pain or hurting in the past 5 days  []  1. Rarely or not at all  [x]  2. Occasionally  []  3. Frequently  []  4. Almost constantly  []  8. Unable to answer  **If the patient answers \"0. Does not apply\" to this question, skip the next two \"Pain\" questions**      Pain Interference with Therapy Activities  \"Over the past 5 days, how often have you limited your participation in rehabilitation therapy sessions due to pain? \"  []  0. Does not apply - I have not received rehabilitation therapy in the past 5 days  [x]  1. Rarely or not at all  []  2. Occasionally  []  3. Frequently  []  4. Almost constantly  []  8. Unable to answer    Pain Interference with Day-to-Day Activities: \"Over the past 5 days, how often have you limited your day-to-day activities (excluding rehabilitation therapy session)? \"  []  1. Rarely or not at all  []  2. Occasionally  [x]  3. Frequently  []  4. Almost constantly  []  8.   Unable to answer

## 2023-11-20 NOTE — FLOWSHEET NOTE
4 Eyes Skin Assessment     NAME:  Matheus Araujo  YOB: 1950  MEDICAL RECORD NUMBER:  [id number]    The patient is being assessed for  Admission    I agree that at least one RN has performed a thorough Head to Toe Skin Assessment on the patient. ALL assessment sites listed below have been assessed. Areas assessed by both nurses:  Scattered bruising and left hip incision. Head, Face, Ears, Shoulders, Back, Chest, Arms, Elbows, Hands, Sacrum. Buttock, Coccyx, Ischium, and Legs. Feet and Heels        Does the Patient have a Wound?  No noted wound(s)       Kendall Prevention initiated by RN: No  Wound Care Orders initiated by RN: No    Pressure Injury (Stage 3,4, Unstageable, DTI, NWPT, and Complex wounds) if present, place Wound referral order by RN under : No    New Ostomies, if present place, Ostomy referral order under : No     Nurse 1 eSignature: Electronically signed by Anil Batres RN on 11/20/23 at 7:00 PM EST    **SHARE this note so that the co-signing nurse can place an eSignature**    Nurse 2 eSignature: Electronically signed by Kellie Cox RN on 11/20/23 at 7:43 PM EST

## 2023-11-20 NOTE — CARE COORDINATION
Met with patient and family and discussed ARU. Explained to patient the required 3 hours of therapy a day. Also explained the average length of stay is 11 days, could be longer or shorter depending on recommendations of therapy and Dr. Wesley Rubio. Patient expresses her understanding and states she's agreeable to admit to ARU. Per patient and family goal is for patient to return home with spouse at discharge from ARU. Patient meets criteria and is approved to come to ARU. Patient able to admit once medically stable and after ARU Medical Director and  sign the pre-admission screen (PAS). Notified MD and CM of approval.     Reached out to financial regarding the listed BCBS medicare on her facesheet. Per financial patients BCBS Medicare is supplemental/Rx. Medicare is patients primary insurance. Patient verified this information also.

## 2023-11-20 NOTE — PROGRESS NOTES
V2.0    [facility] [place]      Name:  Haseeb Evans /Age/Sex: 1950  (75 y.o. female)   MRN & CSN:  [id number] & [id number] Encounter Date/Time: 2023 12:45 PM EST   Location:  [address] [place] [address]-A PCP: MARCO A Cabello     Attending:Alejandrina Johnston, [address] [place] [address] Day: 4    Assessment and Recommendations   Haseeb Evans is a 75 y.o. female who presents with Hip fracture requiring operative repair, left, closed, initial encounter ([address] [facility])      Plan:     Acute comminuted nondisplaced left greater trochanteric fracture  X-ray of the left hip-no acute osseous abnormality  CT hip-revealed left hip fracture, questionable intertrochanteric extension. MRI of the left hip done which reported that the fracture extends into the intertrochanteric region. Greater trochanter is displaced. Orthopedic surgery-Dr. Cristy Ramirez  s/p IV fluids, antiemetics  Patient stated that opioids make her sick to stomach and is refusing  -Got Toradol x 3 days (So far 5 Doses) - Will limit to 5 Day use. S/p-  trochanteric nail fixation 23  PT/OT - recommended ARU  Patient overall doing better. Pain controlled. Vital stable and medically ready for DC. Pending ARU placement. Follow up with ortho as OP     Acute comminuted minimally displaced intra-articular fracture of the left proximal humerus  In a sling     Non-anion gap metabolic acidosis - resolved post IVF    Leukocytosis  could be reactive post surgery   Monitor with repeat CBC     Anemia  Hemoglobin 11.5, monitor H&H     Multiple falls recently  -Patient had a fall -in grass fields, reported that the grass was wet and she slipped and fell on the road-had laceration to the bridge of nose. Patient was evaluated in ER-CT head, CT C-spine-no acute abnormality  -Fall 11/10/2023-patient was evaluated in the ER-imaging did not reveal any acute fractures and was discharged  PT/OT- Recommended ARU    #.   Hypothyroidism/history of Graves' disease  -Patient had intra-articular fracture of the proximal humerus. Joint alignment is maintained. Advanced glenohumeral joint and moderate AC joint osteoarthritis. No erosions are present. No evidence of rotator cuff calcification. Left elbow: No acute fracture or dislocation. Joint alignment and joint spaces are maintained. No joint effusion or erosion is present. 1. Acute, comminuted, minimally displaced intra-articular fracture of the proximal left humerus. 2. Advanced osteoarthritis of the glenohumeral joint. Moderate osteoarthritis of the AC joint. 3. Decreased bone mineral density. 4. No acute abnormality of the left elbow. XR ELBOW LEFT (MIN 3 VIEWS)    Result Date: 11/17/2023  EXAMINATION: 3 X-RAY VIEWS OF THE LEFT SHOULDER; THREE X-RAY VIEWS OF THE LEFT ELBOW 11/17/2023 11:38 am COMPARISON: None HISTORY: ORDERING SYSTEM PROVIDED HISTORY:  Please include Y view. Fall 2 days ago. TECHNOLOGIST PROVIDED HISTORY: Reason for exam:  Please include Y view. Fall 2 days ago. FINDINGS: Bone mineral density is significantly decreased. Left Shoulder: There is an acute, comminuted, minimally displaced intra-articular fracture of the proximal humerus. Joint alignment is maintained. Advanced glenohumeral joint and moderate AC joint osteoarthritis. No erosions are present. No evidence of rotator cuff calcification. Left elbow: No acute fracture or dislocation. Joint alignment and joint spaces are maintained. No joint effusion or erosion is present. 1. Acute, comminuted, minimally displaced intra-articular fracture of the proximal left humerus. 2. Advanced osteoarthritis of the glenohumeral joint. Moderate osteoarthritis of the AC joint. 3. Decreased bone mineral density. 4. No acute abnormality of the left elbow. XR CHEST (2 VW)    Result Date: 11/17/2023  EXAMINATION: 2 XRAY VIEWS OF THE CHEST 11/17/2023 11:38 am COMPARISON: 8/18/2020 HISTORY: ORDERING SYSTEM PROVIDED HISTORY: fall.  L sided injury TECHNOLOGIST for:  \"BC\"  No results found for: \"BLOODCULT2\"  Organism: No results found for: \"ORG\"      Electronically signed by Wolfgang Torres MD on 11/20/2023 at 11:49 AM

## 2023-11-20 NOTE — CARE COORDINATION
CM attempted to see pt this morning to follow up on ARU recs. Pt was unavailable, speaking with Dr. Marce Martinez.

## 2023-11-20 NOTE — PROGRESS NOTES
Physical Therapy  Facility/Department: [facility]  Physical Therapy Initial Assessment    Name: Adrianna Leyva  : 1950  MRN: [id number]  Date of Service: 2023    Discharge Recommendations:  IP Rehab          Patient Diagnosis(es): Closed fracture of left hip and s/p L HIP IM NAIL BRIE INSERTION  Past Medical History:  has a past medical history of Hyperlipidemia. Past Surgical History:  has a past surgical history that includes Hysterectomy, total abdominal; Cholecystectomy; Appendectomy; eye surgery; Cataract removal; Colonoscopy; Total Thyroidectomy; and hip surgery (Left, 2023). Assessment   Body Structures, Functions, Activity Limitations Requiring Skilled Therapeutic Intervention: Decreased functional mobility ; Decreased safe awareness;Decreased high-level IADLs;Decreased ADL status; Decreased endurance;Decreased strength;Decreased posture;Decreased balance; Increased pain  Therapy Prognosis: Good  Decision Making: High Complexity  Clinical Presentation: unpredictable characteristics  Requires PT Follow-Up: Yes  Activity Tolerance  Activity Tolerance: Patient tolerated evaluation without incident     Plan   Physical Therapy Plan  General Plan: 6-7 times per week  Current Treatment Recommendations: Strengthening, ROM, Balance training, Functional mobility training, Transfer training, ADL/Self-care training, IADL training, Endurance training, Equipment evaluation, education, & procurement, Wheelchair mobility training, Gait training, Stair training, Neuromuscular re-education, Pain management, Safety education & training, Patient/Caregiver education & training, Therapeutic activities, Home exercise program, Positioning  Safety Devices  Type of Devices:  All fall risk precautions in place, Patient at risk for falls, Call light within reach, Left in chair, Chair alarm in place, Gait belt, Nurse notified     Restrictions  Restrictions/Precautions  Restrictions/Precautions: General Precautions, Fall Risk, Weight Bearing  Lower Extremity Weight Bearing Restrictions  Left Lower Extremity Weight Bearing: Weight Bearing As Tolerated     Subjective   General  Chart Reviewed: Yes  Patient assessed for rehabilitation services?: Yes  Family / Caregiver Present: Yes  Follows Commands: Within Functional Limits  Subjective  Subjective: pain: 5/10; LLE         Social/Functional History  Social/Functional History  Lives With: Spouse  Type of Home: House  Home Layout: One level  Home Access: Stairs to enter without rails  Entrance Stairs - Number of Steps: 1  Bathroom Shower/Tub: Walk-in shower  Bathroom Toilet: Standard  Bathroom Equipment: Shower chair  Home Equipment: DocDoc  Has the patient had two or more falls in the past year or any fall with injury in the past year?: Yes  ADL Assistance: Independent  Homemaking Assistance: Independent  Ambulation Assistance: Independent  Transfer Assistance: Independent  Active : Yes  Vision/Hearing  Vision  Vision: Within Functional Limits  Hearing  Hearing: Within functional limits    Cognition   Orientation  Overall Orientation Status: Within Functional Limits  Cognition  Overall Cognitive Status: Exceptions  Arousal/Alertness: Appropriate responses to stimuli  Following Commands:  Follows all commands without difficulty  Attention Span: Appears intact  Safety Judgement: Decreased awareness of need for safety;Decreased awareness of need for assistance  Problem Solving: Decreased awareness of errors  Insights: Decreased awareness of deficits  Initiation: Requires cues for some  Sequencing: Requires cues for some     Objective   Pulse: 92  Heart Rate Source: Monitor  BP: (!) 165/73  BP Location: Right upper arm  BP Method: Automatic  Patient Position: Supine  MAP (Calculated): 104  Respirations: 23  SpO2: 93 %  O2 Device: Nasal cannula        Gross Assessment  Sensation: Intact (BLEs)        Strength RLE  Strength RLE: WFL  Strength LLE  Comment: knee extension: at least

## 2023-11-20 NOTE — CARE COORDINATION
CM spoke with pt in room re; discharge plan. Pt is agreeable to ARU referral. Second choice is Swing Bed. Referral made to [facility]. The pt's chart lists [employer] as the pt's primary insurance. The pt states that their primary is Medicare A and B. SUGEY let [facility] know. She is checking with financial counseling. CM left SNF list with the pt in room in case both ARU and Swing Bed deny. If the pt's primary is Medicare A/B and ARU can accept, then the pt could potentially discharge today. CM following.

## 2023-11-20 NOTE — DISCHARGE SUMMARY
V2.0  Discharge Summary    Name:  Josefine Osgood /Age/Sex: 1950 (24 y.o. female)   Admit Date: 2023  Discharge Date: 23    MRN & CSN:  [id number] & [id number] Encounter Date and Time 23 4:17 PM EST    Attending:  Coni Lawrence,* Discharging Provider: Coni Lawrence MD       Hospital Course:     Brief [address] is a 67 y.o. female with hypothyroidism, depression, anxiety, hyperlipidemia presented to ED with complaints of left hip pain and left shoulder pain. She reported that she had a fall a week ago on Friday (11/10/2023) when she was visiting her  great granddaughter at [facility].  She reported that she missed a step and fell on her left side. Patient was evaluated in the ER at that time and imaging did not reveal any acute fractures and was discharged home. Patient was having left shoulder pain since the fall and was taking ibuprofen. Patient reported that she was able to walk until today. When she woke up today morning she was unable to bear weight on her left leg, which prompted her to come to the ER. Patient stated that her left shoulder pain has greatly improved after placing it in a sling. Currently denying any headache, nausea, vomiting, chest pain, shortness of breath, denied any abdominal pain, denied any urinary complaints, denied any constipation or diarrhea. Vitals on arrival-/75, temp 96.7, HR 84, RR 20, saturating 98% on room air. Labs significant for CO2 20, random glucose 145, WBC 12.3, hemoglobin 11.5. Chest x-ray-no acute process. X-ray of the left elbow, left shoulder-proximal left humerus fracture, x-ray of the left hip moderate bilateral hip osteoarthritis, no acute osseous abnormality. CT of the left hip, MRI of the left hip-left hip fracture extending into the intertrochanteric region. Orthopedic surgery consulted from ED. Patient received Tylenol, ibuprofen in ED.     Brief Problem Based TECHNOLOGIST PROVIDED HISTORY: Reason for exam:->eval for intertrochanteric Fx. What is the sedation requirement?->None Decision Support Exception - unselect if not a suspected or confirmed emergency medical condition->Emergency Medical Condition (MA) Additional signs and symptoms: acute left hip pain S/P fall and FX seen on CT FINDINGS: BONE MARROW: Fracture of the left hip. This is inter trochanteric. The greater trochanter is diffusely edema toes. The fracture extends to the base of the lesser trochanter. It is nondisplaced. No dislocation. Osteophytes of the acetabula and femoral heads bilaterally. No widening of the SI joints. No other fractures are appreciated. HIP JOINT: Osteoarthritic. Small joint effusion. LABRUM: No appreciable labral tear or paralabral cyst. BURSAE: Edema along the greater trochanter. SCIATIC NERVE: Normal MRI appearance of the sciatic nerve. MUSCLES / TENDONS: Edema along the gluteus medius and minimus insertions. They are not detached. INTRAPELVIC CONTENTS / SOFT TISSUES: Edema around the greater trochanter did which is expected. Moderate edema in the subcutaneous tissues. The left hip fracture extends into the inter trochanteric region. The greater trochanter is displaced. CT HIP LEFT WO CONTRAST    Result Date: 11/17/2023  EXAMINATION: CT OF THE LEFT HIP WITHOUT CONTRAST 11/17/2023 1:42 pm TECHNIQUE: CT of the left hip was performed without the administration of intravenous contrast.  Multiplanar reformatted images are provided for review. Automated exposure control, iterative reconstruction, and/or weight based adjustment of the mA/kV was utilized to reduce the radiation dose to as low as reasonably achievable. COMPARISON: Same day hip radiographs HISTORY ORDERING SYSTEM PROVIDED HISTORY: continued pain. possible Fx seen on xray TECHNOLOGIST PROVIDED HISTORY: Reason for exam:->continued pain.   possible Fx seen on xray Decision Support Exception - unselect if not a COMPARISON: None HISTORY: ORDERING SYSTEM PROVIDED HISTORY:  Please include Y view. Fall 2 days ago. TECHNOLOGIST PROVIDED HISTORY: Reason for exam:  Please include Y view. Fall 2 days ago. FINDINGS: Bone mineral density is significantly decreased. Left Shoulder: There is an acute, comminuted, minimally displaced intra-articular fracture of the proximal humerus. Joint alignment is maintained. Advanced glenohumeral joint and moderate AC joint osteoarthritis. No erosions are present. No evidence of rotator cuff calcification. Left elbow: No acute fracture or dislocation. Joint alignment and joint spaces are maintained. No joint effusion or erosion is present. 1. Acute, comminuted, minimally displaced intra-articular fracture of the proximal left humerus. 2. Advanced osteoarthritis of the glenohumeral joint. Moderate osteoarthritis of the AC joint. 3. Decreased bone mineral density. 4. No acute abnormality of the left elbow. XR CHEST (2 VW)    Result Date: 11/17/2023  EXAMINATION: 2 XRAY VIEWS OF THE CHEST 11/17/2023 11:38 am COMPARISON: 8/18/2020 HISTORY: ORDERING SYSTEM PROVIDED HISTORY: fall. L sided injury TECHNOLOGIST PROVIDED HISTORY: Reason for exam:->fall. L sided injury FINDINGS: The cardiomediastinal silhouette is normal in size. The lungs are clear. No pleural effusion or pneumothorax is present. Acute, minimally displaced, comminuted proximal left humerus fracture, incompletely evaluated. No acute cardiopulmonary process. XR HIP 2-3 VW W PELVIS LEFT    Result Date: 11/17/2023  EXAMINATION: ONE XRAY VIEW OF THE PELVIS AND TWO XRAY VIEWS LEFT HIP 11/17/2023 11:38 am COMPARISON: None. HISTORY: ORDERING SYSTEM PROVIDED HISTORY: Fall TECHNOLOGIST PROVIDED HISTORY: Reason for exam:->fall FINDINGS: No fracture or dislocation. Moderate bilateral hip osteoarthritis. Normal soft tissues. Degenerative changes of the visualized lower lumbar spine and pubic symphysis.      No acute osseous

## 2023-11-21 PROBLEM — R26.9 GAIT DISTURBANCE: Status: ACTIVE | Noted: 2023-11-21

## 2023-11-21 PROBLEM — D72.829 LEUKOCYTOSIS: Status: ACTIVE | Noted: 2023-11-21

## 2023-11-21 PROBLEM — S42.202A: Status: ACTIVE | Noted: 2023-11-21

## 2023-11-21 PROBLEM — R52 UNCONTROLLED PAIN: Status: ACTIVE | Noted: 2023-11-21

## 2023-11-21 PROBLEM — S72.142A INTERTROCHANTERIC FRACTURE OF LEFT HIP, CLOSED, INITIAL ENCOUNTER (HCC): Status: ACTIVE | Noted: 2023-11-21

## 2023-11-21 PROBLEM — S72.112A CLOSED DISPLACED FRACTURE OF GREATER TROCHANTER OF LEFT FEMUR (HCC): Status: ACTIVE | Noted: 2023-11-20

## 2023-11-21 PROBLEM — D62 ACUTE BLOOD LOSS ANEMIA: Status: ACTIVE | Noted: 2023-11-21

## 2023-11-21 PROBLEM — F41.8 DEPRESSION WITH ANXIETY: Status: ACTIVE | Noted: 2023-11-21

## 2023-11-21 PROBLEM — E78.2 MIXED HYPERLIPIDEMIA: Status: ACTIVE | Noted: 2023-11-21

## 2023-11-21 PROBLEM — R53.1 GENERALIZED WEAKNESS: Status: ACTIVE | Noted: 2023-11-21

## 2023-11-21 PROBLEM — E03.9 ACQUIRED HYPOTHYROIDISM: Status: ACTIVE | Noted: 2023-11-21

## 2023-11-21 PROCEDURE — 97535 SELF CARE MNGMENT TRAINING: CPT

## 2023-11-21 PROCEDURE — 99232 SBSQ HOSP IP/OBS MODERATE 35: CPT | Performed by: PHYSICAL MEDICINE & REHABILITATION

## 2023-11-21 PROCEDURE — 97110 THERAPEUTIC EXERCISES: CPT

## 2023-11-21 PROCEDURE — 1280000000 HC REHAB R&B

## 2023-11-21 PROCEDURE — 97116 GAIT TRAINING THERAPY: CPT

## 2023-11-21 PROCEDURE — 97530 THERAPEUTIC ACTIVITIES: CPT

## 2023-11-21 PROCEDURE — 97167 OT EVAL HIGH COMPLEX 60 MIN: CPT

## 2023-11-21 PROCEDURE — 6370000000 HC RX 637 (ALT 250 FOR IP): Performed by: STUDENT IN AN ORGANIZED HEALTH CARE EDUCATION/TRAINING PROGRAM

## 2023-11-21 PROCEDURE — 97162 PT EVAL MOD COMPLEX 30 MIN: CPT

## 2023-11-21 PROCEDURE — 94761 N-INVAS EAR/PLS OXIMETRY MLT: CPT

## 2023-11-21 PROCEDURE — 6370000000 HC RX 637 (ALT 250 FOR IP): Performed by: PHYSICAL MEDICINE & REHABILITATION

## 2023-11-21 PROCEDURE — 6360000002 HC RX W HCPCS: Performed by: STUDENT IN AN ORGANIZED HEALTH CARE EDUCATION/TRAINING PROGRAM

## 2023-11-21 RX ORDER — TRAMADOL HYDROCHLORIDE 50 MG/1
50 TABLET ORAL EVERY 6 HOURS PRN
Status: DISCONTINUED | OUTPATIENT
Start: 2023-11-21 | End: 2023-11-28

## 2023-11-21 RX ORDER — ACETAMINOPHEN 325 MG/1
650 TABLET ORAL
Status: DISCONTINUED | OUTPATIENT
Start: 2023-11-21 | End: 2023-11-28

## 2023-11-21 RX ADMIN — FLUTICASONE PROPIONATE 1 SPRAY: 50 SPRAY, METERED NASAL at 20:48

## 2023-11-21 RX ADMIN — ACETAMINOPHEN 650 MG: 325 TABLET ORAL at 10:58

## 2023-11-21 RX ADMIN — POLYETHYLENE GLYCOL (3350) 17 G: 17 POWDER, FOR SOLUTION ORAL at 20:51

## 2023-11-21 RX ADMIN — ACETAMINOPHEN 650 MG: 325 TABLET ORAL at 06:28

## 2023-11-21 RX ADMIN — ACETAMINOPHEN 650 MG: 325 TABLET ORAL at 20:47

## 2023-11-21 RX ADMIN — FLUTICASONE PROPIONATE 1 SPRAY: 50 SPRAY, METERED NASAL at 10:58

## 2023-11-21 RX ADMIN — SERTRALINE HYDROCHLORIDE 100 MG: 50 TABLET ORAL at 20:47

## 2023-11-21 RX ADMIN — ATORVASTATIN CALCIUM 10 MG: 10 TABLET, FILM COATED ORAL at 20:48

## 2023-11-21 RX ADMIN — LEVOTHYROXINE SODIUM 137 MCG: 112 TABLET ORAL at 06:29

## 2023-11-21 RX ADMIN — TRAMADOL HYDROCHLORIDE 50 MG: 50 TABLET, COATED ORAL at 20:47

## 2023-11-21 RX ADMIN — ENOXAPARIN SODIUM 40 MG: 100 INJECTION SUBCUTANEOUS at 10:54

## 2023-11-21 RX ADMIN — KETOROLAC TROMETHAMINE 15 MG: 30 INJECTION, SOLUTION INTRAMUSCULAR; INTRAVENOUS at 01:04

## 2023-11-21 RX ADMIN — PANTOPRAZOLE SODIUM 40 MG: 40 TABLET, DELAYED RELEASE ORAL at 06:28

## 2023-11-21 ASSESSMENT — PAIN DESCRIPTION - LOCATION
LOCATION: LEG
LOCATION: HEAD;LEG;ARM
LOCATION: HIP

## 2023-11-21 ASSESSMENT — PAIN DESCRIPTION - ORIENTATION
ORIENTATION: LEFT
ORIENTATION: LEFT

## 2023-11-21 ASSESSMENT — PAIN DESCRIPTION - DESCRIPTORS
DESCRIPTORS: DISCOMFORT;ACHING
DESCRIPTORS: GNAWING
DESCRIPTORS: ACHING;THROBBING

## 2023-11-21 ASSESSMENT — PAIN - FUNCTIONAL ASSESSMENT
PAIN_FUNCTIONAL_ASSESSMENT: PREVENTS OR INTERFERES SOME ACTIVE ACTIVITIES AND ADLS

## 2023-11-21 ASSESSMENT — PAIN SCALES - GENERAL
PAINLEVEL_OUTOF10: 7
PAINLEVEL_OUTOF10: 0
PAINLEVEL_OUTOF10: 8
PAINLEVEL_OUTOF10: 3

## 2023-11-21 ASSESSMENT — PAIN SCALES - WONG BAKER
WONGBAKER_NUMERICALRESPONSE: 0
WONGBAKER_NUMERICALRESPONSE: 0

## 2023-11-21 NOTE — PLAN OF CARE
ARU Interdisciplinary Plan of Care [facility]  [address]  [address]  [phone]  Fax: [phone]        Elaine Officer    : 1950  Acct #: [de-identified]  MRN: [id number]   PHYSICIAN:  Jose Rueda MD  Primary Active Problems:   Active Hospital Problems    Diagnosis Date Noted    Intertrochanteric fracture of left hip, closed, initial encounter [facility]) Genet Speedy 2023    Traumatic closed fracture of proximal end of left humerus with minimal displacement [S42.202A] 2023    Uncontrolled pain [R52] 2023    Generalized weakness [R53.1] 2023    Gait disturbance [R26.9] 2023    Acute blood loss anemia [D62] 2023    Depression with anxiety [F41.8] 2023    Acquired hypothyroidism [E03.9] 2023    Mixed hyperlipidemia [E78.2] 2023    Leukocytosis [D72.829] 2023    Closed displaced fracture of greater trochanter of left femur [facility]) Kira Aranda 2023       Rehabilitation Diagnosis:     Closed left hip fracture ([address]) [S72.002A]  Nondisplaced fracture of greater trochanter of left femur, initial encounter for closed fracture [facility]) Nick Erm          CARE PLAN     NURSING:  Elaine Officer while on this unit will:      Bowel and Bladder   [] Be continent of bowel and bladder      [x] Have an adequate number of bowel movements   [] Urinate with no urinary retention >300ml in bladder   [] Bladder Scan: (details)   [] Complete bladder protocol with barriga removal   [] Initiate Bladder Program to toilet every ___ hours   [] Initiate Bowel Program to toilet every ___hours   [] Bladder training    [] Bowel training  Pulmonary   [x] Maintain O2 SATs at 92% or greater  Pain Management   [x] Have pain managed while on ARU        [] Be pain free by discharge    [x] Medication Management and Education  Maintenance of Skin Integrity/Wound Management   [x] Have no skin breakdown

## 2023-11-21 NOTE — CARE COORDINATION
Case Management Admission Note      Patient:Marine Oliva      :1950  DO  Rehab Dx/Hx: Closed left hip fracture ([address]) [S72.002A]  Nondisplaced fracture of greater trochanter of left femur, initial encounter for closed fracture [facility]) [S72.115A]    Chief Complaint:   Past Medical History:   Diagnosis Date    Hyperlipidemia      Past Surgical History:   Procedure Laterality Date    APPENDECTOMY      CATARACT REMOVAL      CHOLECYSTECTOMY      COLONOSCOPY      EYE SURGERY      HIP SURGERY Left 2023    HIP IM NAIL BRIE INSERTION performed by Ayleen Wise MD at [address], TOTAL ABDOMINAL (CERVIX REMOVED)      TOTAL THYROIDECTOMY       Allergies   Allergen Reactions    Tetracyclines & Related Other (See Comments)     Ears turned purple and swelled up     Precautions: falls    Date of Admit: 2023  Room #: 4611/4976-D      Current functional status at time of admit:        Home Living/DME Available:                             IADL Hx:                       Spouse: Angelika Wayne  Family:    Patient's Goal:    Patient plans dc home with spouse. Wants to be as independent as possible  Family's Goal:   Comments:  Patient plans dc 1-level home with spouse. She can assist as needed. She thinks he'd benefit from caregiver training. She has no specific dc concerns. Whiteboard updated.   She reports she's already asked for clothes to be brought in today/    eBillme, 2023, 9:28 AM

## 2023-11-21 NOTE — PROGRESS NOTES
Comprehensive Nutrition Assessment    Type and Reason for Visit:  Initial, Consult (oral nutrition supplement)    Nutrition Recommendations/Plan:   Continue regular diet per order  Will offer oral nutrition supplement during stay for additional protein  Will continue to follow up during stay      Malnutrition Assessment:  Malnutrition Status:  No malnutrition (11/21/23 1258)    Context:  Acute Illness       Nutrition Assessment:    Admit to acute rehab with hx fall, humerus and femur fracture now s/p hip surgery. Able to tolerate regular diet with recent meal intake %. Patient agreeable to have oral nutrition supplement during stay and may continue taking when at home. No signficant weight loss in past 6 months per hx. Will follow at moderate nutrition risk at this time with icreased needs. Nutrition Related Findings:    resting in bed waiting on assist from nursing, Hb 9.9, hx thyroidectomy Wound Type: Surgical Incision       Current Nutrition Intake & Therapies:    Average Meal Intake: %  Average Supplements Intake: None Ordered  ADULT DIET; Regular    Anthropometric Measures:  Height: 160 cm (5' 3\")  Ideal Body Weight (IBW): 115 lbs (52 kg)    Admission Body Weight: 69.7 kg (153 lb 10.6 oz)  Current Body Weight: 69.9 kg (154 lb 1.6 oz), 134 % IBW. Weight Source: Bed Scale  Current BMI (kg/m2): 27.3  Usual Body Weight: 72.1 kg (158 lb 15.2 oz) (hx May 2023 MD office)  % Weight Change (Calculated): -3.1  Weight Adjustment For: No Adjustment                 BMI Categories: Overweight (BMI 25.0-29. 9)    Estimated Daily Nutrient Needs:  Energy Requirements Based On: Formula  Weight Used for Energy Requirements: Current  Energy (kcal/day): 7442-5225 (m[address] st jeor)  Weight Used for Protein Requirements: Ideal  Protein (g/day): 62-73 (1.2-1.4 g/kg)  Method Used for Fluid Requirements: ml/Kg  Fluid (ml/day): 2000 (30 ml/kg)    Nutrition Diagnosis:   Predicted inadequate energy intake related to increase demand for energy/nutrients as evidenced by other (comment) (recovery from fall, healing fractures)    Nutrition Interventions:   Food and/or Nutrient Delivery: Continue Current Diet, Start Oral Nutrition Supplement  Nutrition Education/Counseling: No recommendation at this time  Coordination of Nutrition Care: Continue to monitor while inpatient  Plan of Care discussed with: patient    Goals:     Goals: PO intake 75% or greater, by next RD assessment       Nutrition Monitoring and Evaluation:   Behavioral-Environmental Outcomes: None Identified  Food/Nutrient Intake Outcomes: Food and Nutrient Intake, Supplement Intake, Diet Advancement/Tolerance  Physical Signs/Symptoms Outcomes: Biochemical Data, Meal Time Behavior, Skin, Weight    Discharge Planning:    Continue Oral Nutrition Supplement     Cj Tse, [address] [facility],   Contact: [phone]

## 2023-11-21 NOTE — CARE COORDINATION
Patient reviewed at today's care conference. Discharge home is anticipated 11/30/23. She may need a BSC and quad cane. [address] pt/ot recommended. Case mgt attempted to update patient in room following care conference. Patient with therapy. 1445:  case mgt updated patient and spouse in room. Agreeable to dc date and plan. She hopes to progress quickly and dc home prior to 11/30/23. Whiteboard updated.

## 2023-11-21 NOTE — PROGRESS NOTES
rales. Cardiac: Regular rate and rhythm. Abdomen: Patient's abdomen is soft and nondistended. Bowel sounds were present throughout. There was no rebound, guarding or masses noted. Upper extremities: Her left upper limb was tender and swollen as expected. The sling immobilized her shoulder and elbow. She had a weak  of the left hand but could move all digits spontaneously. Minimal swelling. Her right arm moves through a functional range of motion with normal strength. Lower extremities: Her left side was tender and swollen. Her surgical incisions are well approximated but dressed. No signs of DVT. Full ankle dorsiflexion bilaterally. Sitting balance was fair-.  Standing balance was poor. Lab Results   Component Value Date    WBC 11.7 (H) 11/20/2023    HGB 9.9 (L) 11/20/2023    HCT 31.0 (L) 11/20/2023    MCV 96.6 11/20/2023     11/20/2023     Lab Results   Component Value Date    INR 1.0 11/18/2023    PROTIME 13.7 11/18/2023     Lab Results   Component Value Date    CREATININE 0.5 (L) 11/20/2023    BUN 21 11/20/2023     11/20/2023    K 4.5 11/20/2023     (H) 11/20/2023    CO2 22 11/20/2023     Lab Results   Component Value Date    ALT 8 (L) 11/20/2023    AST 13 (L) 11/20/2023    ALKPHOS 86 11/20/2023    BILITOT 0.6 11/20/2023       Expected length of stay  prior to a supervised level of function for discharge home with a Jm walker and [facility] OT/PT is 2 weeks. Recommendations:    Minimally displaced left greater trochanter fracture with extension into the intertrochanteric region and proximal left humerus fracture: We are developing the routine for her daily occupational and physical therapy. With her poorly controlled pain, her significant weakness and nonweightbearing through her left upper limb, she is at risk for fall with injury during standing ADLs and mobility activities.   Providing her adaptive equipment training with strengthening exercises, balance recovery training and conditioning development. Instructing her on techniques for self-care and transfers maintaining no weightbearing through her left upper limb. Monitoring her hip incisions for signs of infection while providing DVT prophylaxis and aggressive pulmonary hygiene measures. Emphasizing pain management and caregiver training to prepare her for returning home with her spouse. Outpatient follow-up with orthopedics in 2 weeks. Verbal cues and moderate to maximum physical assistance for transfers today. DVT prophylaxis: Continuing Lovenox 40 mg subcu daily. This raises her risk for spontaneous hemorrhage or GI bleeding. Monitoring her hemoglobin and platelet count regularly. Continuing GI prophylaxis with a PPI. Weightbearing activities through her lower limbs are limited but tried daily. No signs of acute blood loss. Uncontrolled pain: Continuing the scheduled acetaminophen 4 times daily. She is reluctant to use narcotics due to a history of GI upset/nausea with them. Continuing Toradol for now but the doses are limited to 11/22/2023 (midday). Perhaps a nonnarcotic analgesic like tramadol would be tolerated at that point. Considering adding a different anti-inflammatory despite the inherent risk with mixing it with Lovenox. Continuing limb elevation to control swelling and cryotherapy. She may benefit from a muscle relaxant to potentiate the impact of her mild analgesics. Acquired hypothyroidism: Continue current home medication of Synthroid replacement at 137 mcg daily. Monitoring her heart rate and bowel activity and response to this medication. Mixed hyperlipidemia: Continuing Lipitor 10 mg nightly. Encouraging heart healthy diet choices. Outpatient follow-up with her PCP. Depression with anxiety: Providing Zoloft 100 mg nightly. Treating her in a calm consistent environment when possible.   Involving her family and caregiver training along course of her rehab stay as much as possible. Leukocytosis: Monitoring for fever curve, stability of her hip incision and bladder habits closely. Follow-up chest x-ray and urinalysis showed her white blood count remain above normal with the next check on 11/22/2023.

## 2023-11-21 NOTE — H&P
Elaine Officer    : 1950  Acct #: [de-identified]  MRN: [id number]              History and physical    Date of face-to-face exam: 2023. Time of face-to-face exam: .. Admitting diagnosis: Left greater trochanteric hip fracture with extension into the intertrochanteric region ([facility] 8.4)    Comorbid diagnoses impacting rehabilitation: Minimally displaced intra-articular fracture of the proximal left humerus, uncontrolled pain, generalized weakness, gait disturbance, acute blood loss anemia, acquired hypothyroidism, leukocytosis, mixed hyperlipidemia, depression with anxiety    Chief complaint: Left arm and left leg pain. History of present illness: The patient is a 67-year-old right-hand-dominant female who suffered multiple falls in the 10 days preceding her presentation to our hospital.  Originally, she suffered a slip and fall striking her face on the roadway [place] on 2023. She was evaluated with head CT and cervical spine imaging at [facility] without significant findings. 4 days later she suffered a trip and fall while carrying items on the campus of [facility] in [place] on 2023. She does not recall any precipitating dizziness, palpitations or loss of consciousness at that time. She hurt her left arm and left leg and had plain films done at that [facility] facility. Evidently they were negative for fractures and she was discharged home. She was discharged home and she subsequently struggled to maintain her self-care due to lingering hip and shoulder pain. On 2023 she awoke with much worse left hip pain. Being unable to walk at all, she was brought to our ED. Initial x-rays of her hip were negative but her left shoulder showed a proximal humerus fracture. Follow-up CT imaging of her left hip identified a minimally displaced greater trochanteric fracture with extension into the intertrochanteric region.   On 2023 11/20/2023     Lab Results   Component Value Date    INR 1.0 11/18/2023    PROTIME 13.7 11/18/2023     Lab Results   Component Value Date    CREATININE 0.5 (L) 11/20/2023    BUN 21 11/20/2023     11/20/2023    K 4.5 11/20/2023     (H) 11/20/2023    CO2 22 11/20/2023     Lab Results   Component Value Date    ALT 8 (L) 11/20/2023    AST 13 (L) 11/20/2023    ALKPHOS 86 11/20/2023    BILITOT 0.6 11/20/2023         Impression: 57-year-old female with a history of hypothyroidism, mixed hyperlipidemia and depression who suffered a mechanical fall with the above injuries. She is status post surgical fixation of her left hip and has been offered on operative care for her left shoulder. She is nonweightbearing to the left lower limb, she has uncontrolled pain, acute blood loss anemia and persistent leukocytosis. Strengths for the patient: Her alertness, her independent habits prior to admission and her supportive spouse. Limitations/barriers for the patient: Her age, the nonweightbearing through her left upper limb and the poorly controlled nature of her pain. Recommendation: Acute inpatient rehabilitation with occupational and physical therapy 180 minutes 5 out of every 7 days. Will address basic and  advancing mobility with self-care instruction and adaptive equipment training. Caregiver education will be offered. Expected length of stay  prior to a supervised level of function for discharge home with a Jm walker and [facility] OT/PT is 2 weeks. Additional recommendation:    Minimally displaced left greater trochanteric hip fracture with extension into the intertrochanteric region and a proximal left humerus fracture: Patient requires daily occupational and physical therapy. With her poorly controlled pain, her significant weakness and nonweightbearing through her left upper limb, she is at risk for fall with injury during standing ADLs and mobility activities.   Therefore, we must provide her adaptive fever curve, stability of her hip incision and bladder habits closely. Follow-up chest x-ray and urinalysis showed her white blood count remain above normal with the next check on 11/22/2023. I personally performed a history and physical on this patient within 24 hours of admission to the rehab unit. I have reviewed the preadmission screening and concur with its findings without change. A detailed plan of care will be established by hospital day 4 and I attest the patient is appropriate for inpatient rehabilitation at this time. I have compared the patient's current functional status noted during my history and physical with that of the preadmission screen and I have found no significant differences.

## 2023-11-21 NOTE — PROGRESS NOTES
Physical Therapy  [facility] ARU PHYSICAL THERAPY EVALUATION    Chart Review:  Past Medical History:   Diagnosis Date    Hyperlipidemia      Past Surgical History:   Procedure Laterality Date    APPENDECTOMY      CATARACT REMOVAL      CHOLECYSTECTOMY      COLONOSCOPY      EYE SURGERY      HIP SURGERY Left 11/18/2023    HIP IM NAIL BRIE INSERTION performed by Rosa Elena Enriquez MD at [address], [address] (CERVIX REMOVED)      TOTAL THYROIDECTOMY       Fall History: Has the patient had two or more falls in the past year or any fall with injury in the past year?: Yes (had 2 falls, one on the 6th with face contusion requiring 3 stitches on nose; one on the 10th resulting in L humerus and hip fx's (not diagnosed until the 17th))  Social History:  Social/Functional History  Lives With: Spouse Bud Coy)  Type of Home: House  Home Layout: One level  Home Access: Stairs to enter without rails  Entrance Stairs - Number of Steps: 1 TERESITA  Bathroom Shower/Tub: Walk-in shower  Bathroom Toilet: Standard  Bathroom Equipment: Shower chair, Grab bars in shower  Bathroom Accessibility: Accessible (for quad cane)  Home Equipment: Delisa Martinis  Has the patient had two or more falls in the past year or any fall with injury in the past year?: Yes (had 2 falls, one on the 6th with face contusion requiring 3 stitches on nose; one on the 10th resulting in L humerus and hip fx's (not diagnosed until the 17th))  ADL Assistance: [address] [place] Responsibilities: Yes (primary for all)  Ambulation Assistance: Independent  Transfer Assistance: Independent  Active : Yes  Mode of Transportation: [facility]  Occupation: Retired  Type of Occupation:   Leisure & Hobbies: Shop, decorate, 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)  Additional Comments: Pt typically sleeps in a flat regular bed at home    Restrictions:  Restrictions/Precautions  Restrictions/Precautions: General Precautions, Fall Risk (WBAT LLE,

## 2023-11-21 NOTE — PROGRESS NOTES
Occupational Therapy                              [facility] ARU OCCUPATIONAL THERAPY EVALUATION    Chart Review:  Past Medical History:   Diagnosis Date    Hyperlipidemia      Past Surgical History:   Procedure Laterality Date    APPENDECTOMY      CATARACT REMOVAL      CHOLECYSTECTOMY      COLONOSCOPY      EYE SURGERY      HIP SURGERY Left 11/18/2023    HIP IM NAIL BRIE INSERTION performed by Tabatha Schmidt MD at [address], [address] (CERVIX REMOVED)      TOTAL THYROIDECTOMY       Social History:  Social/Functional History  Lives With: Spouse Adele Notice)  Type of Home: House  Home Layout: One level  Home Access: Stairs to enter without rails  Entrance Stairs - Number of Steps: 1 TERESITA  Bathroom Shower/Tub: Walk-in shower  Bathroom Toilet: Standard  Bathroom Equipment: Shower chair, Grab bars in shower  Bathroom Accessibility: Accessible (for quad cane)  Home Equipment: [employer]  Has the patient had two or more falls in the past year or any fall with injury in the past year?: Yes (had 2 falls, one on the 6th with face contusion requiring 3 stitches on nose; one on the 10th resulting in L humerus and hip fx's (not diagnosed until the 17th))  ADL Assistance: [id number] [place] Responsibilities: Yes (primary for all)  Ambulation Assistance: Independent  Transfer Assistance: Independent  Active : Yes  Mode of Transportation: [facility]  Occupation: Retired  Type of Occupation:   Leisure & Hobbies: Shop, decorate, 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)  Additional Comments: Pt typically sleeps in a flat regular bed at home    Restrictions:  Restrictions/Precautions  Restrictions/Precautions: General Precautions, Fall Risk (WBAT LLE, NWB LLE in sling, per Dr Orin Bang no shoulder ROM but elbow/wrist/digit OK)  Lower Extremity Weight Bearing Restrictions  Left Lower Extremity Weight Bearing: Weight Bearing As Tolerated  Upper Extremity Weight Bearing Restrictions  Left Upper Goal 6: Pt will complete toileting tasks Ind  Long Term Goal 7: Pt will complete functional transfers (bed, chair, toilet, shower) c DME PRN and mod I  Long Term Goal 8: Pt will perform therex/therax to facilitate increased strength/endurance/ax tolerance (c emphasis on dynamic standing balance/tolerance >10 mins, RUE endurance) c SBA  Long Term Goal 9: Pt will complete simple homemaking tasks c DME PRN and mod I    Plan:    Pt will be seen at least 60 minutes per day for a minimum of 5 days per week, plus group therapy as appropriate  Current Treatment Recommendations: Functional mobility training, Balance training, Endurance training, Pain management, Safety education & training, Patient/Caregiver education & training, Equipment evaluation, education, & procurement, Self-Care / ADL, Home management training    OT Individual Minutes  Time In: 6709  Time Out: 8028  Minutes: 90                Number of Minutes/Billable Intervention      OT Evaluation 25   Therapeutic Exercise    ADL Self-care 65   Neuro Re-Ed    Therapeutic Activity    Group    Other:    TOTAL 90     Electronically signed by:    Armando Eddy MS, OTR/L  License #[id number]  11/21/2023, 4:27 PM

## 2023-11-21 NOTE — PATIENT CARE CONFERENCE
ACUTE REHAB TEAM CONFERENCE SUMMARY   [address] [facility]    NAME: Isaiah Ramirez  : 1950 ADMIT DATE: 2023    Rehab Admitting Dx:Closed left hip fracture ([address]) [S72.002A]  Nondisplaced fracture of greater trochanter of left femur, initial encounter for closed fracture ([address]) [S72.115A]  Patient Comorbid Conditions:    Active Hospital Problems    Diagnosis Date Noted    Intertrochanteric fracture of left hip, closed, initial encounter ([address]) Jose Mayo 2023    Traumatic closed fracture of proximal end of left humerus with minimal displacement [S42.202A] 2023    Uncontrolled pain [R52] 2023    Generalized weakness [R53.1] 2023    Gait disturbance [R26.9] 2023    Acute blood loss anemia [D62] 2023    Depression with anxiety [F41.8] 2023    Acquired hypothyroidism [E03.9] 2023    Mixed hyperlipidemia [E78.2] 2023    Leukocytosis [D72.829] 2023    Closed displaced fracture of greater trochanter of left femur [facility] Jerome Beasley 2023     Date: 2023    CASE MANAGEMENT  Current issues/needs regarding patient and family discharge status: plan is for discharge home with spouse  Patient and family goal: Return home as independently as possible    PHYSICAL THERAPY (Updated in QI)  To be evaluated today    Ambulation:          Walk 10 Feet  Assistance Needed: Supervision or touching assistance  Comment: quad cane, steadying  CARE Score: 4                                               Wheelchair:  w/c Ability:                          Balance:        Object:      Fall Risk: [x]  Yes  []  No    OCCUPATIONAL THERAPY  (Updated in QI)  Short Term Goals  Time Frame for Short Term Goals: STGs=LTGs :   Long Term Goals  Time Frame for Long Term Goals : 7-10 days or until d/c  Long Term Goal 1: Pt will complete grooming tasks Ind  Long Term Goal 2: Pt will complete total body bathing mod I using AE PRN  Long Term Goal 3: Pt will

## 2023-11-22 LAB
HCT VFR BLD CALC: 29.3 % (ref 37–47)
HEMOGLOBIN: 9.2 GM/DL (ref 12.5–16)
MCH RBC QN AUTO: 30.7 PG (ref 27–31)
MCHC RBC AUTO-ENTMCNC: 31.4 % (ref 32–36)
MCV RBC AUTO: 97.7 FL (ref 78–100)
PDW BLD-RTO: 13.6 % (ref 11.7–14.9)
PLATELET # BLD: 371 K/CU MM (ref 140–440)
PMV BLD AUTO: 8.9 FL (ref 7.5–11.1)
RBC # BLD: 3 M/CU MM (ref 4.2–5.4)
WBC # BLD: 11.7 K/CU MM (ref 4–10.5)

## 2023-11-22 PROCEDURE — 94761 N-INVAS EAR/PLS OXIMETRY MLT: CPT

## 2023-11-22 PROCEDURE — 97530 THERAPEUTIC ACTIVITIES: CPT

## 2023-11-22 PROCEDURE — 97116 GAIT TRAINING THERAPY: CPT

## 2023-11-22 PROCEDURE — 97535 SELF CARE MNGMENT TRAINING: CPT

## 2023-11-22 PROCEDURE — 6370000000 HC RX 637 (ALT 250 FOR IP): Performed by: PHYSICAL MEDICINE & REHABILITATION

## 2023-11-22 PROCEDURE — 85027 COMPLETE CBC AUTOMATED: CPT

## 2023-11-22 PROCEDURE — 6370000000 HC RX 637 (ALT 250 FOR IP): Performed by: STUDENT IN AN ORGANIZED HEALTH CARE EDUCATION/TRAINING PROGRAM

## 2023-11-22 PROCEDURE — 6360000002 HC RX W HCPCS: Performed by: STUDENT IN AN ORGANIZED HEALTH CARE EDUCATION/TRAINING PROGRAM

## 2023-11-22 PROCEDURE — 1280000000 HC REHAB R&B

## 2023-11-22 PROCEDURE — 36415 COLL VENOUS BLD VENIPUNCTURE: CPT

## 2023-11-22 PROCEDURE — 97110 THERAPEUTIC EXERCISES: CPT

## 2023-11-22 PROCEDURE — 99232 SBSQ HOSP IP/OBS MODERATE 35: CPT | Performed by: PHYSICAL MEDICINE & REHABILITATION

## 2023-11-22 RX ADMIN — ACETAMINOPHEN 650 MG: 325 TABLET ORAL at 12:14

## 2023-11-22 RX ADMIN — FLUTICASONE PROPIONATE 1 SPRAY: 50 SPRAY, METERED NASAL at 22:56

## 2023-11-22 RX ADMIN — ACETAMINOPHEN 650 MG: 325 TABLET ORAL at 05:30

## 2023-11-22 RX ADMIN — ACETAMINOPHEN 650 MG: 325 TABLET ORAL at 18:08

## 2023-11-22 RX ADMIN — TRAMADOL HYDROCHLORIDE 50 MG: 50 TABLET, COATED ORAL at 08:26

## 2023-11-22 RX ADMIN — ATORVASTATIN CALCIUM 10 MG: 10 TABLET, FILM COATED ORAL at 22:50

## 2023-11-22 RX ADMIN — ACETAMINOPHEN 650 MG: 325 TABLET ORAL at 22:50

## 2023-11-22 RX ADMIN — ENOXAPARIN SODIUM 40 MG: 100 INJECTION SUBCUTANEOUS at 08:26

## 2023-11-22 RX ADMIN — SERTRALINE HYDROCHLORIDE 100 MG: 50 TABLET ORAL at 22:51

## 2023-11-22 RX ADMIN — PANTOPRAZOLE SODIUM 40 MG: 40 TABLET, DELAYED RELEASE ORAL at 05:29

## 2023-11-22 RX ADMIN — LEVOTHYROXINE SODIUM 137 MCG: 112 TABLET ORAL at 05:30

## 2023-11-22 ASSESSMENT — PAIN DESCRIPTION - ORIENTATION
ORIENTATION: LEFT
ORIENTATION: LEFT

## 2023-11-22 ASSESSMENT — PAIN DESCRIPTION - LOCATION
LOCATION: HIP;LEG
LOCATION: ARM

## 2023-11-22 ASSESSMENT — PAIN DESCRIPTION - DESCRIPTORS
DESCRIPTORS: ACHING;DISCOMFORT
DESCRIPTORS: ACHING;DISCOMFORT

## 2023-11-22 ASSESSMENT — PAIN SCALES - GENERAL
PAINLEVEL_OUTOF10: 4
PAINLEVEL_OUTOF10: 6
PAINLEVEL_OUTOF10: 0
PAINLEVEL_OUTOF10: 0

## 2023-11-22 ASSESSMENT — PAIN SCALES - WONG BAKER: WONGBAKER_NUMERICALRESPONSE: 0

## 2023-11-22 NOTE — PROGRESS NOTES
Molly Cantrell    : 1950  Acct #: [de-identified]  MRN: [id number]              PM&R Progress Note      Admitting diagnosis: Left greater trochanteric hip fracture with extension into the intertrochanteric region ([facility] 8.4)     Comorbid diagnoses impacting rehabilitation: Minimally displaced intra-articular fracture of the proximal left humerus, uncontrolled pain, generalized weakness, gait disturbance, acute blood loss anemia, acquired hypothyroidism, leukocytosis, mixed hyperlipidemia, depression with anxiety     Chief complaint: Fair appetite. No nausea, cough or chills. Pain as before. Prior (baseline) level of function: Independent. Current level of function:         Current  IRF-CHUNG and Goals:   Occupational Therapy:    Short Term Goals  Time Frame for Short Term Goals: STGs=LTGs :   Long Term Goals  Time Frame for Long Term Goals : 7-10 days or until d/c  Long Term Goal 1: Pt will complete grooming tasks Ind  Long Term Goal 2: Pt will complete total body bathing mod I using AE PRN  Long Term Goal 3: Pt will complete UB dressing c mod I including managing sling  Long Term Goal 4: Pt will complete LB dressing mod I using AE PRN  Long Term Goal 5: Pt will doff/don footwear mod I using AE PRN  Additional Goals?: Yes  Long Term Goal 6: Pt will complete toileting tasks Ind  Long Term Goal 7: Pt will complete functional transfers (bed, chair, toilet, shower) c DME PRN and mod I  Long Term Goal 8: Pt will perform therex/therax to facilitate increased strength/endurance/ax tolerance (c emphasis on dynamic standing balance/tolerance >10 mins, RUE endurance) c SBA  Long Term Goal 9: Pt will complete simple homemaking tasks c DME PRN and mod I :                                       Eating: Eating  Assistance Needed: Setup or clean-up assistance  Comment: reports needing some assist with opening packages 2* LUE in sling.   Encouraged pt to begin sitting upright for all meals as this will help facilitate extremities: Her left shoulder and arm are swollen as before. Mild tenderness to palpation. Fair  strength. Full digit abduction on the left. Sensation intact. Lower extremities: Left hip and thigh were tender and swollen as expected. Incisions were dressed and dry. No signs of DVT. Sitting balance was good. Standing balance was poor. Lab Results   Component Value Date    WBC 11.7 (H) 11/22/2023    HGB 9.2 (L) 11/22/2023    HCT 29.3 (L) 11/22/2023    MCV 97.7 11/22/2023     11/22/2023     Lab Results   Component Value Date    INR 1.0 11/18/2023    PROTIME 13.7 11/18/2023     Lab Results   Component Value Date    CREATININE 0.5 (L) 11/20/2023    BUN 21 11/20/2023     11/20/2023    K 4.5 11/20/2023     (H) 11/20/2023    CO2 22 11/20/2023     Lab Results   Component Value Date    ALT 8 (L) 11/20/2023    AST 13 (L) 11/20/2023    ALKPHOS 86 11/20/2023    BILITOT 0.6 11/20/2023       Expected length of stay  prior to a supervised level of function for discharge home with a walker and [facility] OT/PT is 11/30/2023. Recommendations:    Minimally displaced left greater trochanter fracture with extension into the intertrochanteric region and proximal left humerus fracture: She is responding to our cueing to safely sequence activities during the daily occupational and physical therapy. She is compliant with the nonweightbearing restriction for her left arm. With ongoing arm and leg pain, her significant weakness and the nonweightbearing restriction through her left upper limb, she remains at risk for fall with injury during standing ADLs and mobility activities. Continue providing her adaptive equipment training with strengthening exercises, balance recovery training and conditioning development. Providing repetitious practice of techniques for self-care and transfers maintaining no weightbearing through her left upper limb.   Our monitoring her hip incisions reveals no signs of

## 2023-11-22 NOTE — PROGRESS NOTES
11/22/23 0836   Encounter Summary   Encounter Overview/Reason  Initial Encounter   Service Provided For: Patient   [facility]   Support System Spouse   Last Encounter  11/22/23  (Patient awake, sitting on side of bed, ready for therapy to begin. Good support system in place and no specific needs at this time. Blessings given.)   Complexity of Encounter Low   Begin Time 0830   End Time  0837   Total Time Calculated 7 min   Spiritual/Emotional needs   Type Spiritual Support   Assessment/Intervention/Outcome   Assessment Calm;Coping; Hopeful   Intervention Active listening;Empowerment;Nurtured Hope;Prayer (assurance of)/[place];Sustaining Presence/Ministry of presence   Outcome Comfort;Coping;Encouraged;Engaged in conversation;Expressed feelings, needs, and concerns;Expressed Gratitude   Plan and Referrals   Plan/Referrals Continue Support (comment)  (Patient aware how to contact )

## 2023-11-22 NOTE — PROGRESS NOTES
Occupational Therapy    Physical Rehabilitation: OCCUPATIONAL THERAPY     [x] daily progress note       [] discharge       Patient Name:  Hali Pack   :  1950 MRN: [id number]  Room:  [address] [place], [place] [address]A Date of Admission: 2023  Rehabilitation Diagnosis:   Closed left hip fracture ([address]) [S72.002A]  Nondisplaced fracture of greater trochanter of left femur, initial encounter for closed fracture ([address]) [S72.115A]       Date 2023       Day of ARU Week:  3   Time IN/OUT 1000/1100   Individual Tx Minutes 60   Group Tx Minutes    Co-Treat Minutes    Concurrent Tx Minutes    TOTAL Tx Time Mins 60   Variance Time    Variance Time []   Refusal due to:     []   Medical hold/reason:    []   Illness   []   Off Unit for test/procedure  []   Extra time needed to complete task  []   Therapeutic need  []   Other (specify):   Restrictions Restrictions/Precautions: General Precautions, Fall Risk (WBAT LLE, NWB LLE in sling, per Dr Quintin Antonio no shoulder ROM but elbow/wrist/digit OK)         Communication with other providers: [x]   OK to see per nursing:     []   Spoke with team member regarding:      Subjective observations and cognitive status: Pt seated EOB on approach, pleasant and agreeable to tx session. Declined shower/dressing as that was just completed yesterday. Requested to lay down in bed at end of session to alleviate pain      Pain level/location:    6/10       Location: L hip, shoulder   Discharge recommendations  Anticipated discharge date:    Destination: []home alone   []home alone w assist prn   [x] home w/ family    [] Continuous supervision       []SNF    [] Assisted living     [] Other:   Continued therapy: [x]HHC OT  []OUTPATIENT  OT   [] No Further OT  Equipment needs: Possible BSC ?        ADLs:     Oral Hygiene: Oral Hygiene  Assistance Needed: Supervision or touching assistance  Comment: sup in stance at sink  CARE Score: 4  Discharge Goal: Independent    UB Dressing: Educated pt on how emphasis on dynamic standing balance/tolerance >10 mins, RUE endurance) c SBA  Long Term Goal 9: Pt will complete simple homemaking tasks c DME PRN and mod I:        Plan of Care                                                                              Times per week: 5 days per week for a minimum of 60 minutes/day plus group as appropriate for 60 minutes. Treatment to include Occupational Therapy Plan  Current Treatment Recommendations: Functional mobility training, Balance training, Endurance training, Pain management, Safety education & training, Patient/Caregiver education & training, Equipment evaluation, education, & procurement, Self-Care / ADL, Home management training    Electronically signed by   Juliet Tavarez MS, OTR/L  License #[id number]  11/22/2023, 12:40 PM

## 2023-11-22 NOTE — PROGRESS NOTES
Physical Therapy  [x] daily progress note       [] discharge       Patient Name:  Adrianna Leyva   :  1950 MRN: [id number]  Room:  [address] [place], [place] [address] Date of Admission: 2023  Rehabilitation Diagnosis:   Closed left hip fracture ([address]) [S72.002A]  Nondisplaced fracture of greater trochanter of left femur, initial encounter for closed fracture ([address]) [S72.115A]       Date 2023       Day of ARU Week:  3   Time IN//930, 1258/1358   Individual Tx Minutes 60,60   Group Tx Minutes    Co-Treat Minutes    Concurrent Tx Minutes    TOTAL Tx Time Mins 120   Variance Time    Variance Time []   Refusal due to:     []   Medical hold/reason:    []   Illness   []   Off Unit for test/procedure  []   Extra time needed to complete task  []   Therapeutic need  []   Other (specify):   Restrictions Restrictions/Precautions  Restrictions/Precautions: General Precautions, Fall Risk (WBAT LLE, NWB LLE in sling, per Dr Alex Back no shoulder ROM but elbow/wrist/digit OK)      Interdisciplinary communication [x]   Cleared for therapy per nursing     []   RN notified about issues during session  []   RN updated on pt performance  []   Spoke with   []   Spoke with OT  []   Spoke with MD  []   Other:    Subjective observations and cognitive status: AM: Pt sitting EOB, agreeable to therapy. Had morning meds. Pt states she was able to perform 5 reps of ex last night with no adverse affects. Increasing to 10 today to assess tolerance. PM:pt in bed, agreeable to therapy.  Pt stated she was more painful in pm. Altered plan slightly to accommodate      Pain level/location:    610 AM, 710 pm       Location: L hip (0 in shoulder)   Discharge recommendations  Anticipated discharge date:    Destination: []home alone   []home alone with assist PRN     [x] home w/ family      [] Continuous supervision  []SNF    [] Assisted living     [] Other:  Continued therapy: [x]HHC PTvs  [x]OUTPATIENT  PT   [] No Further PT Training 45   Therapeutic Exercise 45   Neuro Re-Ed    Therapeutic Activity 30   Wheelchair Propulsion    Group    Other:    TOTAL 120         Social History  Social/Functional History  Lives With: Spouse Bennie Quiñones)  Type of Home: House  Home Layout: One level  Home Access: Stairs to enter without rails  Entrance Stairs - Number of Steps: 1 TERESITA  Bathroom Shower/Tub: Walk-in shower  Bathroom Toilet: Standard  Bathroom Equipment: Shower chair, Grab bars in shower  Bathroom Accessibility: Accessible (for quad cane)  Home Equipment: Lindalee Borne  Has the patient had two or more falls in the past year or any fall with injury in the past year?: Yes (had 2 falls, one on the 6th with face contusion requiring 3 stitches on nose; one on the 10th resulting in L humerus and hip fx's (not diagnosed until the 17th))  ADL Assistance: [address] Independent  Homemaking Responsibilities: Yes (primary for all)  Ambulation Assistance: Independent  Transfer Assistance: Independent  Active : Yes  Mode of Transportation: 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)  Additional Comments: Pt typically sleeps in a flat regular bed at home    Objective                                                                                    Goals:  (Update in navigator)  [address]  Time Frame for Short Term Goals: 7 days STG=LTG  Short Term Goal 1: pt will perform bed mobility with mod I  Short Term Goal 2: pt will perform all functional transfers mod I  Short Term Goal 3: Pt will ambulate 150' on level surfaces and 10' on unlevel surfaces with LRAD mod I  Short Term Goal 4: pt will ascend/descned curb step with LRAD and 12 steps with rail with mod I  Short Term Goal 5: Pt will  light object from floor with mod I with reacher:   :        Plan of Care                                                                              Times per week:

## 2023-11-22 NOTE — CARE COORDINATION
Spoke with patient to discuss discharge plans and to obtain [facility] [address] choice. Patient to review list and make a selection. No choice made at this time. Patient provided with list of Medicare participating [address] [place] [address] in the geographic area of the patient served. Patient selected TBD and was provided with a comparative data handout from [address] [place] [address] website. The patient (and/or Family) was educated on the quality outcomes for each provider. Patient (and/or Family) demonstrated understanding. Per patient/family request, referral made to [facility].

## 2023-11-23 PROCEDURE — 94761 N-INVAS EAR/PLS OXIMETRY MLT: CPT

## 2023-11-23 PROCEDURE — 1280000000 HC REHAB R&B

## 2023-11-23 PROCEDURE — 6370000000 HC RX 637 (ALT 250 FOR IP): Performed by: STUDENT IN AN ORGANIZED HEALTH CARE EDUCATION/TRAINING PROGRAM

## 2023-11-23 PROCEDURE — 6360000002 HC RX W HCPCS: Performed by: STUDENT IN AN ORGANIZED HEALTH CARE EDUCATION/TRAINING PROGRAM

## 2023-11-23 PROCEDURE — 94150 VITAL CAPACITY TEST: CPT

## 2023-11-23 PROCEDURE — 94664 DEMO&/EVAL PT USE INHALER: CPT

## 2023-11-23 PROCEDURE — 6370000000 HC RX 637 (ALT 250 FOR IP): Performed by: PHYSICAL MEDICINE & REHABILITATION

## 2023-11-23 RX ADMIN — LEVOTHYROXINE SODIUM 137 MCG: 112 TABLET ORAL at 06:22

## 2023-11-23 RX ADMIN — FLUTICASONE PROPIONATE 1 SPRAY: 50 SPRAY, METERED NASAL at 21:34

## 2023-11-23 RX ADMIN — ENOXAPARIN SODIUM 40 MG: 100 INJECTION SUBCUTANEOUS at 08:24

## 2023-11-23 RX ADMIN — ACETAMINOPHEN 650 MG: 325 TABLET ORAL at 06:23

## 2023-11-23 RX ADMIN — FLUTICASONE PROPIONATE 1 SPRAY: 50 SPRAY, METERED NASAL at 08:24

## 2023-11-23 RX ADMIN — PANTOPRAZOLE SODIUM 40 MG: 40 TABLET, DELAYED RELEASE ORAL at 06:23

## 2023-11-23 RX ADMIN — ACETAMINOPHEN 650 MG: 325 TABLET ORAL at 21:32

## 2023-11-23 RX ADMIN — SERTRALINE HYDROCHLORIDE 100 MG: 50 TABLET ORAL at 21:32

## 2023-11-23 RX ADMIN — ACETAMINOPHEN 650 MG: 325 TABLET ORAL at 17:41

## 2023-11-23 RX ADMIN — ATORVASTATIN CALCIUM 10 MG: 10 TABLET, FILM COATED ORAL at 21:32

## 2023-11-23 ASSESSMENT — PAIN DESCRIPTION - ORIENTATION
ORIENTATION: LEFT

## 2023-11-23 ASSESSMENT — PAIN DESCRIPTION - ONSET
ONSET: ON-GOING
ONSET: ON-GOING

## 2023-11-23 ASSESSMENT — PAIN DESCRIPTION - PAIN TYPE
TYPE: ACUTE PAIN
TYPE: ACUTE PAIN

## 2023-11-23 ASSESSMENT — PAIN DESCRIPTION - LOCATION
LOCATION: SHOULDER
LOCATION: HIP
LOCATION: ARM

## 2023-11-23 ASSESSMENT — PAIN SCALES - GENERAL
PAINLEVEL_OUTOF10: 3
PAINLEVEL_OUTOF10: 6
PAINLEVEL_OUTOF10: 6
PAINLEVEL_OUTOF10: 5
PAINLEVEL_OUTOF10: 2

## 2023-11-23 ASSESSMENT — PAIN - FUNCTIONAL ASSESSMENT
PAIN_FUNCTIONAL_ASSESSMENT: PREVENTS OR INTERFERES SOME ACTIVE ACTIVITIES AND ADLS
PAIN_FUNCTIONAL_ASSESSMENT: PREVENTS OR INTERFERES SOME ACTIVE ACTIVITIES AND ADLS

## 2023-11-23 ASSESSMENT — PAIN DESCRIPTION - FREQUENCY
FREQUENCY: INTERMITTENT
FREQUENCY: INTERMITTENT

## 2023-11-23 ASSESSMENT — PAIN DESCRIPTION - DESCRIPTORS
DESCRIPTORS: ACHING
DESCRIPTORS: ACHING
DESCRIPTORS: DISCOMFORT

## 2023-11-24 PROCEDURE — 6370000000 HC RX 637 (ALT 250 FOR IP): Performed by: STUDENT IN AN ORGANIZED HEALTH CARE EDUCATION/TRAINING PROGRAM

## 2023-11-24 PROCEDURE — 6370000000 HC RX 637 (ALT 250 FOR IP): Performed by: PHYSICAL MEDICINE & REHABILITATION

## 2023-11-24 PROCEDURE — 94150 VITAL CAPACITY TEST: CPT

## 2023-11-24 PROCEDURE — 97116 GAIT TRAINING THERAPY: CPT

## 2023-11-24 PROCEDURE — 97110 THERAPEUTIC EXERCISES: CPT

## 2023-11-24 PROCEDURE — 99232 SBSQ HOSP IP/OBS MODERATE 35: CPT | Performed by: PHYSICAL MEDICINE & REHABILITATION

## 2023-11-24 PROCEDURE — 94761 N-INVAS EAR/PLS OXIMETRY MLT: CPT

## 2023-11-24 PROCEDURE — 6360000002 HC RX W HCPCS: Performed by: STUDENT IN AN ORGANIZED HEALTH CARE EDUCATION/TRAINING PROGRAM

## 2023-11-24 PROCEDURE — 1280000000 HC REHAB R&B

## 2023-11-24 PROCEDURE — 97530 THERAPEUTIC ACTIVITIES: CPT

## 2023-11-24 PROCEDURE — 97535 SELF CARE MNGMENT TRAINING: CPT

## 2023-11-24 RX ADMIN — SERTRALINE HYDROCHLORIDE 100 MG: 50 TABLET ORAL at 21:04

## 2023-11-24 RX ADMIN — ATORVASTATIN CALCIUM 10 MG: 10 TABLET, FILM COATED ORAL at 21:04

## 2023-11-24 RX ADMIN — ACETAMINOPHEN 650 MG: 325 TABLET ORAL at 21:04

## 2023-11-24 RX ADMIN — PANTOPRAZOLE SODIUM 40 MG: 40 TABLET, DELAYED RELEASE ORAL at 06:33

## 2023-11-24 RX ADMIN — ACETAMINOPHEN 650 MG: 325 TABLET ORAL at 06:33

## 2023-11-24 RX ADMIN — ENOXAPARIN SODIUM 40 MG: 100 INJECTION SUBCUTANEOUS at 10:37

## 2023-11-24 RX ADMIN — ACETAMINOPHEN 650 MG: 325 TABLET ORAL at 17:45

## 2023-11-24 RX ADMIN — ACETAMINOPHEN 650 MG: 325 TABLET ORAL at 10:34

## 2023-11-24 RX ADMIN — LEVOTHYROXINE SODIUM 137 MCG: 112 TABLET ORAL at 06:32

## 2023-11-24 ASSESSMENT — PAIN DESCRIPTION - DESCRIPTORS
DESCRIPTORS: ACHING
DESCRIPTORS: ACHING
DESCRIPTORS: ACHING;DISCOMFORT

## 2023-11-24 ASSESSMENT — PAIN DESCRIPTION - FREQUENCY: FREQUENCY: INTERMITTENT

## 2023-11-24 ASSESSMENT — PAIN - FUNCTIONAL ASSESSMENT
PAIN_FUNCTIONAL_ASSESSMENT: ACTIVITIES ARE NOT PREVENTED
PAIN_FUNCTIONAL_ASSESSMENT: PREVENTS OR INTERFERES SOME ACTIVE ACTIVITIES AND ADLS

## 2023-11-24 ASSESSMENT — PAIN DESCRIPTION - ORIENTATION
ORIENTATION: LEFT

## 2023-11-24 ASSESSMENT — PAIN SCALES - GENERAL
PAINLEVEL_OUTOF10: 7
PAINLEVEL_OUTOF10: 1
PAINLEVEL_OUTOF10: 4
PAINLEVEL_OUTOF10: 2

## 2023-11-24 ASSESSMENT — PAIN DESCRIPTION - LOCATION
LOCATION: ARM
LOCATION: HIP
LOCATION: ARM

## 2023-11-24 ASSESSMENT — PAIN SCALES - WONG BAKER
WONGBAKER_NUMERICALRESPONSE: 0
WONGBAKER_NUMERICALRESPONSE: 0

## 2023-11-24 ASSESSMENT — PAIN DESCRIPTION - PAIN TYPE: TYPE: ACUTE PAIN

## 2023-11-24 ASSESSMENT — PAIN DESCRIPTION - ONSET: ONSET: ON-GOING

## 2023-11-24 NOTE — CARE COORDINATION
Spoke with patient to discuss discharge plans. Per PT note patient may discharge home with [address] or possible outpatient therapy. At this time the patient has not made a [address] choice. LSW provided patient with a list of Outpatient facilities in her geographic area. D/C Plan:  Estimated Date: Nov 30  DME:  Dmitri Mueller, [facility] (Agency TBD)  [address] vs Outpatient:  PT, OT (Agency TBD)  To:   Home with spouse (family will transport)

## 2023-11-24 NOTE — PROGRESS NOTES
Independent    UB/LB Bathing: Shower/Bathe Self  Assistance Needed: Supervision or touching assistance  Comment: Pt able to complete full shower c SBA in stance to wash perineal area; able to wash distal BLEs using LHS  CARE Score: 4  Discharge Goal: Independent    UB Dressing: Upper Body Dressing  Assistance Needed: Partial/moderate assistance  Comment: Pt able to unbotton shirt and thread BUE's into shirt and around back and fasten buttons; Pt required mod A to doff/don sling  CARE Score: 3  Discharge Goal: Independent         LB Dressing: Lower Body Dressing  Assistance Needed: Partial/moderate assistance  Comment: Pt able to thread BLEs into underwear and pants; required slight assist to manage over L hip  CARE Score: 3  Discharge Goal: Independent    Donning and [place] Footwear: Putting On/Taking Off Footwear  Assistance Needed: Partial/moderate assistance  Comment: Pt able to doff socks; required some babak to don both socks  CARE Score: 3  Discharge Goal: Independent      [address] needed: Partial/moderate assistance  Comment: Pt required assist to doff/don pants on L side; performed BM hygiene c Supervision  CARE Score: 3  Discharge Goal: Independent      Toilet Transfers:   SBA  Toilet Transfer  Assistance needed: Supervision or touching assistance  Comment: SBA c Quad cane and grab bar  CARE Score: 4  Discharge Goal: Independent  Device Used:    [x]   Standard Toilet         [x]   Grab Bars           []  Bedside Commode       []   Elevated Toilet          []   Other:        Bed Mobility:           []   Pt received out of bed   Supine --> Sit:  min A at trunk       Transfers:    Sit--> Stand:  SBA  Stand --> Sit:   SBA  Stand-Pivot:   SBA  Other:    Assistive device required for transfer:   RW       Functional Mobility:  to bathroom   Assistance:  SBA   Device:   []   Rolling Walker     []   Standard Walker []   Wheelchair        [x]   [employer]       []   [employer] []   Cardiac Walker       []   Other:        Additional Therapeutic activities/exercises completed this date:     [x]   ADL Training   [x]   Balance/Postural training     [x]   Bed/Transfer Training   []   Endurance Training   []   Neuromuscular Re-ed   []   Nu-step:  Time:        Level:         #Steps:       []   Rebounder:    []  Seated     []  Standing        []   Supine Ther Ex (reps/sets):     []   Seated Ther Ex (reps/sets):     []   Standing Ther Ex (reps/sets):     []   Other:      Comments: All intervention performed to increase pt's strength, endurance, ax tolerance, and balance in prep for increased I c ADL/IADLs and functional transfers/mobility. Patient/Caregiver Education and Training:   []   [employer] Equipment Use  []   Bed Mobility/Transfer Technique/Safety  []   Energy Conservation Tips  []   Family training  []   Postural Awareness  []   Safety During Functional Activities  []   Reinforced Patient's Precautions   []   Progress was updated and reviewed in Rehabtracker with patient and/or family this         date. Treatment Plan for Next Session: Continue OT POC       Assessment: This pt demonstrated a positive response to today's treatment as evidenced by QI scores. The patient is making progress toward established goals as evidenced by QI scores. Ongoing deficits are observed in the areas of strength, ROM on L UE, balance and endurance and continued focus on this is recommended.        Treatment/Activity Tolerance:   [x] Tolerated treatment with no adverse effects    [] Patient limited by fatigue  [] Patient limited by pain   [] Patient limited by medical complications:    [] Adverse reaction to Tx:   [] Significant change in status    Safety:       []  bed alarm set    [x]  chair alarm set    []  Pt refused alarms                []  Telesitter activated      [x]  Gait belt used during tx session      []other:       Number of Minutes/Billable Intervention  Therapeutic Exercise    ADL Self-care 60   Neuro Re-Ed    Therapeutic Activity    Group    Other:    TOTAL 60       Social History  Social/Functional History  Lives With: Spouse Thu San)  Type of Home: House  Home Layout: One level  Home Access: Stairs to enter without rails  Entrance Stairs - Number of Steps: 1 TERESITA  Bathroom Shower/Tub: Walk-in shower  Bathroom Toilet: Standard  Bathroom Equipment: Shower chair, Grab bars in shower  Bathroom Accessibility: Accessible (for quad cane)  Home Equipment: Bebe Mcdonald  Has the patient had two or more falls in the past year or any fall with injury in the past year?: Yes (had 2 falls, one on the 6th with face contusion requiring 3 stitches on nose; one on the 10th resulting in L humerus and hip fx's (not diagnosed until the 17th))  ADL Assistance: Independent  Homemaking Assistance: Independent  Homemaking Responsibilities: Yes (primary for all)  Ambulation Assistance: Independent  Transfer Assistance: Independent  Active : Yes  Mode of Transportation: [employer]  Occupation: Retired  Type of Occupation:   Leisure & Hobbies: Shop, decorate, 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)  Additional Comments: Pt typically sleeps in a flat regular bed at home    Objective                                                                                    Goals:  (Update in navigator)  [address]  Time Frame for Short Term Goals: STGs=LTGs:  Long Term Goals  Time Frame for Long Term Goals : 7-10 days or until d/c  Long Term Goal 1: Pt will complete grooming tasks Ind  Long Term Goal 2: Pt will complete total body bathing mod I using AE PRN  Long Term Goal 3: Pt will complete UB dressing c mod I including managing sling  Long Term Goal 4: Pt will complete LB dressing mod I using AE PRN  Long Term Goal 5: Pt will doff/don footwear mod I using AE PRN  Additional Goals?: Yes  Long Term Goal 6: Pt will complete toileting tasks Ind  Long Term Goal 7: Pt will complete functional transfers (bed, chair, toilet, shower) c DME

## 2023-11-24 NOTE — PROGRESS NOTES
Physical Therapy        [x] daily progress note       [] discharge       Patient Name:  Sanjeev Smith   :  1950 MRN: [id number]  Room:  [address] [place] [address] Date of Admission: 2023  Rehabilitation Diagnosis:   Closed left hip fracture ([address]) [S72.002A]  Nondisplaced fracture of greater trochanter of left femur, initial encounter for closed fracture ([address]) [S72.115A]       Date 2023       Day of ARU Week:  5   Time IN/OUT 8147-8839  3915-2028   Individual Tx Minutes 70+50   TOTAL Tx Time Mins 120   Variance Time    Variance Time []   Refusal due to:     []   Medical hold/reason:    []   Illness   []   Off Unit for test/procedure  []   Extra time needed to complete task  []   Therapeutic need  []   Other (specify):   Restrictions Restrictions/Precautions  Restrictions/Precautions: General Precautions, Fall Risk (WBAT LLE, NWB LLE in sling, per Dr Ajay Marc no shoulder ROM but elbow/wrist/digit OK)      Communication with other providers: [x]   OK to see per nursing:     []   Spoke with team member regarding:      Subjective observations and cognitive status: Pt resting in bed, pleasant and motivated to participate  PM: Pt agreeable, needing to toilet before leaving room.  Needing to toilet x 2 during session   Pain level/location: 5/10       Location: L shoulder down to elbow, denies pain in L hip at rest   Discharge recommendations  Anticipated discharge date:    Destination: []home alone   []home alone with assist PRN     [x] home w/ family      [] Continuous supervision  []SNF    [] Assisted living     [] Other:  Continued therapy: [x]HHC PTvs  [x]OUTPATIENT  PT   [] No Further PT  []SNF PT  Caregiver training recommended: []Yes  [x] No   Equipment needs: type of cane tbd     Bed Mobility:           []   Pt received out of bed   Rolling R/L:  Indep to R with rail  Scooting:  Indep supine scoot to L/R  Supine --> Sit:  SBA from R SL, extra time/effort  Sit --> Supine:  Supervision  Bed features used:

## 2023-11-25 PROCEDURE — 97110 THERAPEUTIC EXERCISES: CPT

## 2023-11-25 PROCEDURE — 6370000000 HC RX 637 (ALT 250 FOR IP): Performed by: STUDENT IN AN ORGANIZED HEALTH CARE EDUCATION/TRAINING PROGRAM

## 2023-11-25 PROCEDURE — 97116 GAIT TRAINING THERAPY: CPT

## 2023-11-25 PROCEDURE — 6370000000 HC RX 637 (ALT 250 FOR IP): Performed by: PHYSICAL MEDICINE & REHABILITATION

## 2023-11-25 PROCEDURE — 94761 N-INVAS EAR/PLS OXIMETRY MLT: CPT

## 2023-11-25 PROCEDURE — 6360000002 HC RX W HCPCS: Performed by: STUDENT IN AN ORGANIZED HEALTH CARE EDUCATION/TRAINING PROGRAM

## 2023-11-25 PROCEDURE — 97535 SELF CARE MNGMENT TRAINING: CPT

## 2023-11-25 PROCEDURE — 94150 VITAL CAPACITY TEST: CPT

## 2023-11-25 PROCEDURE — 97530 THERAPEUTIC ACTIVITIES: CPT

## 2023-11-25 PROCEDURE — 1280000000 HC REHAB R&B

## 2023-11-25 RX ADMIN — ENOXAPARIN SODIUM 40 MG: 100 INJECTION SUBCUTANEOUS at 08:40

## 2023-11-25 RX ADMIN — ATORVASTATIN CALCIUM 10 MG: 10 TABLET, FILM COATED ORAL at 21:17

## 2023-11-25 RX ADMIN — ACETAMINOPHEN 650 MG: 325 TABLET ORAL at 15:02

## 2023-11-25 RX ADMIN — ACETAMINOPHEN 650 MG: 325 TABLET ORAL at 05:59

## 2023-11-25 RX ADMIN — LEVOTHYROXINE SODIUM 137 MCG: 112 TABLET ORAL at 05:59

## 2023-11-25 RX ADMIN — SERTRALINE HYDROCHLORIDE 100 MG: 50 TABLET ORAL at 21:17

## 2023-11-25 RX ADMIN — PANTOPRAZOLE SODIUM 40 MG: 40 TABLET, DELAYED RELEASE ORAL at 05:58

## 2023-11-25 RX ADMIN — ACETAMINOPHEN 650 MG: 325 TABLET ORAL at 21:17

## 2023-11-25 RX ADMIN — FLUTICASONE PROPIONATE 1 SPRAY: 50 SPRAY, METERED NASAL at 08:41

## 2023-11-25 ASSESSMENT — PAIN DESCRIPTION - ORIENTATION: ORIENTATION: LEFT

## 2023-11-25 ASSESSMENT — PAIN SCALES - GENERAL
PAINLEVEL_OUTOF10: 3
PAINLEVEL_OUTOF10: 8
PAINLEVEL_OUTOF10: 0
PAINLEVEL_OUTOF10: 0

## 2023-11-25 ASSESSMENT — PAIN DESCRIPTION - LOCATION: LOCATION: HIP;SHOULDER

## 2023-11-25 ASSESSMENT — PAIN DESCRIPTION - DESCRIPTORS: DESCRIPTORS: DULL;DISCOMFORT

## 2023-11-25 ASSESSMENT — PAIN SCALES - WONG BAKER
WONGBAKER_NUMERICALRESPONSE: 0
WONGBAKER_NUMERICALRESPONSE: 0

## 2023-11-25 NOTE — FLOWSHEET NOTE
[x] daily progress note       [] discharge       Patient Name:  Adrianna Leyva   :  1950 MRN: [id number]  Room:  [address] [place] [address] Date of Admission: 2023  Rehabilitation Diagnosis:   Closed left hip fracture ([address]) [S72.002A]  Nondisplaced fracture of greater trochanter of left femur, initial encounter for closed fracture ([address]) [S72.115A]       Date 2023       Day of ARU Week:  6   Time IN/OUT 3641-7905=60  9270-2445=60   Individual Tx Minutes 60   Group Tx Minutes x   Co-Treat Minutes x   Concurrent Tx Minutes x   TOTAL Tx Time Mins 120   Variance Time x   Variance Time []   Refusal due to:     []   Medical hold/reason:    []   Illness   []   Off Unit for test/procedure  []   Extra time needed to complete task  []   Therapeutic need  []   Other (specify):   Restrictions Restrictions/Precautions  Restrictions/Precautions: General Precautions, Fall Risk (WBAT LLE, NWB LLE in sling, per Dr Alex Back no shoulder ROM but elbow/wrist/digit OK)      Communication with other providers: [x]   OK to see per nursing:     [x]   Spoke with team member regarding:   dressing soaking through onto clothing. Subjective observations and cognitive status: This is the first time I've sat up in this chair, it feels pretty good.   I have not had a chance to work on the exs in bed,  on the hand out they gave me\"     Pain level/location:  Rates pain as \" I'm doing good\"  /10       Location:    Discharge recommendations  Anticipated discharge date:    Destination: []home alone   []home alone with assist PRN     [] home w/ family      [] Continuous supervision  []SNF    [] Assisted living     [] Other:   Continued therapy: []HHC PT  []OUTPATIENT  PT   [] No Further PT  Equipment needs:  type of cane tbd         Bed Mobility:           []   Pt received out of bed   Rolling R/L:  with bed rail assist and SBA for tech vc  Scooting:  with bed rail to  [place] and  laterally, without rail min assist  Lying --> Sit:  MIN assist off flat mat table, education for hook lying, log roll and push, still with min assist at trunk  Sit --> lying:  Sit:  SBA to flat mat table. Sup<> sit completed 3 x during sessions. Transfers:    Sit--> Stand:  cg  Stand --> Sit:   cg  Chair-->Bed/Bed --> Chair:   x  Car Transfers:  x  Toilet Transfer (if applicable): SBA for transfer, min assist to manage pants due to UE in sling and hurts leg to twist.  Other:    Assistive device required for transfer:   LBCQ    Gait:    Distance:   06+43+29 ft  Assistance: cg  Device:  LBQC  Gait Quality:  decreased step through and WS to left        Stairs   # Completed:  4\" curb step  Assistance:  cg  Supportive Device:  LBQC    Uneven Surfaces:       Assistance:    cg  Device:    LBCQ  Surfaces Completed:   [x]  carpet mat with bean bags beneath      []  Throw rugs       []  Ramp       []  Outdoor pavements        []  Grass             []  Loose gravel        []  Other:         Additional Therapeutic activities/exercises completed this date:     []   Nu-step:  Time:        Level:         #Steps:       []   Rebounder:    []  Seated     []  Standing        []   Balance training         []   Postural training    [x]   Supine ther ex (reps/sets):  joint replacement hand out exercises x 20 with set up and cues for tech. []   Seated ther ex (reps/sets):     []   Standing ther ex (reps/sets):     []   Picking up object from floor (standing):                   []   Reacher used   []   Other:   []   Other:    Comments:      Patient/Caregiver Education and Training:   [x]   Bed Mobility/Transfer technique/safety  [x]   Gait technique/sequencing  []   Proper use of assistive device  []   Advanced mobility safety and technique  []   Reinforced patient's precautions/mobility while maintaining precautions  []   Postural awareness  []   Family training  []   Progress was updated and reviewed in Rehabtracker with patient and/or family this date.     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)  Additional Comments: Pt typically sleeps in a flat regular bed at home    Objective                                                                                    Goals:  (Update in navigator)  Short Term Goals  Time Frame for Short Term Goals: 7 days STG=LTG  Short Term Goal 1: pt will perform bed mobility with mod I  Short Term Goal 2: pt will perform all functional transfers mod I  Short Term Goal 3: Pt will ambulate 150' on level surfaces and 10' on unlevel surfaces with LRAD mod I  Short Term Goal 4: pt will ascend/descned curb step with LRAD and 12 steps with rail with mod I  Short Term Goal 5: Pt will  light object from floor with mod I with reacher:   :        Plan of Care                                                                              Times per week: 5 days per week for a minimum of 60 minutes/day plus group as appropriate for 60 minutes.   Treatment to include Current Treatment Recommendations: Strengthening, ROM, Balance training, Functional mobility training, Transfer training, ADL/Self-care training, IADL training, Endurance training, Equipment evaluation, education, & procurement, Wheelchair mobility training, Gait training, Stair training, Neuromuscular re-education, Pain management, Safety education & training, Patient/Caregiver education & training, Therapeutic activities, Home exercise program, Positioning, Group Therapy    Electronically signed by   Zaria Jackson PTA, [id number]   11/25/2023, 1:11 PM

## 2023-11-25 NOTE — PROGRESS NOTES
Kimmie Wong    : 1950  Acct #: [de-identified]  MRN: [id number]              PM&R Progress Note      Admitting diagnosis: Left greater trochanteric hip fracture with extension into the intertrochanteric region ([facility] 8.4)     Comorbid diagnoses impacting rehabilitation: Minimally displaced intra-articular fracture of the proximal left humerus, uncontrolled pain, generalized weakness, gait disturbance, acute blood loss anemia, acquired hypothyroidism, leukocytosis, mixed hyperlipidemia, depression with anxiety     Chief complaint: Minimal tingling in her left hand. No numbness. Same shoulder pain. Prior (baseline) level of function: Independent.     Current level of function:         Current  IRF-CHUNG and Goals:   Occupational Therapy:    Short Term Goals  Time Frame for Short Term Goals: STGs=LTGs :   Long Term Goals  Time Frame for Long Term Goals : 7-10 days or until d/c  Long Term Goal 1: Pt will complete grooming tasks Ind  Long Term Goal 2: Pt will complete total body bathing mod I using AE PRN  Long Term Goal 3: Pt will complete UB dressing c mod I including managing sling  Long Term Goal 4: Pt will complete LB dressing mod I using AE PRN  Long Term Goal 5: Pt will doff/don footwear mod I using AE PRN  Additional Goals?: Yes  Long Term Goal 6: Pt will complete toileting tasks Ind  Long Term Goal 7: Pt will complete functional transfers (bed, chair, toilet, shower) c DME PRN and mod I  Long Term Goal 8: Pt will perform therex/therax to facilitate increased strength/endurance/ax tolerance (c emphasis on dynamic standing balance/tolerance >10 mins, RUE endurance) c SBA  Long Term Goal 9: Pt will complete simple homemaking tasks c DME PRN and mod I :                                       Eating: Eating  Assistance Needed: Independent  Comment: X  CARE Score: 6  Discharge Goal: Independent       Oral Hygiene: Oral Hygiene  Assistance Needed: Supervision or touching assistance  Comment: Sup- Seated at

## 2023-11-25 NOTE — PROGRESS NOTES
Occupational Therapy    Physical Rehabilitation: OCCUPATIONAL THERAPY     [x] daily progress note       [] discharge       Patient Name:  Zuleyma Moreno   :  1950 MRN: [id number]  Room:  [address] [place], [place] [address] Date of Admission: 2023  Rehabilitation Diagnosis:   Closed left hip fracture ([address]) [S72.002A]  Nondisplaced fracture of greater trochanter of left femur, initial encounter for closed fracture ([address]) [S72.115A]       Date 2023       Day of ARU Week:  6   Time IN//930   Individual Tx Minutes 60   Group Tx Minutes    Co-Treat Minutes    Concurrent Tx Minutes    TOTAL Tx Time Mins 60   Variance Time    Variance Time []   Refusal due to:     []   Medical hold/reason:    []   Illness   []   Off Unit for test/procedure  []   Extra time needed to complete task  []   Therapeutic need  []   Other (specify):   Restrictions Restrictions/Precautions: General Precautions, Fall Risk (WBAT LLE, NWB LLE in sling, per Dr Zac Warren no shoulder ROM but elbow/wrist/digit OK)         Communication with other providers: [x]   OK to see per nursing:     []   Spoke with team member regarding:      Subjective observations and cognitive status: Pt at EOB finishing breakfast upon therapist arrival. Pt pleasant and agreeable to therapy. Pt seen by nursing to remove IV port before shower.       Pain level/location:   5 /10       Location: L hip and L Arm    Discharge recommendations  Anticipated discharge date:    Destination: []home alone   []home alone w assist prn   [x] home w/ family    [] Continuous supervision       []SNF    [] Assisted living     [] Other:   Continued therapy: [x]HHC OT  []OUTPATIENT  OT   [] No Further OT  Equipment needs: Possible BSC       ADLs:    Eating: Eating  Assistance Needed: Independent  Comment: X  CARE Score: 6  Discharge Goal: Independent       Oral Hygiene: Oral Hygiene  Assistance Needed: Supervision or touching assistance  Comment: Sup- Seated at sink  CARE Score: 4  Discharge Level:         #Steps:       []   Rebounder:    []  Seated     []  Standing        []   Supine Ther Ex (reps/sets):     []   Seated Ther Ex (reps/sets):     []   Standing Ther Ex (reps/sets):     []   Other:      Comments:      Patient/Caregiver Education and Training:   [x]   [employer] Equipment Use  []   Bed Mobility/Transfer Technique/Safety  []   Energy Conservation Tips  []   Family training  []   Postural Awareness  []   Safety During Functional Activities  []   Reinforced Patient's Precautions   []   Progress was updated and reviewed in Rehabtracker with patient and/or family this         date. Treatment Plan for Next Session: OT POC to continue as tolerated per pt.        Treatment/Activity Tolerance:   [x] Tolerated treatment with no adverse effects    [] Patient limited by fatigue  [] Patient limited by pain   [] Patient limited by medical complications:    [] Adverse reaction to Tx:   [] Significant change in status    Safety:       [x]  bed alarm set    []  chair alarm set    []  Pt refused alarms                []  Telesitter activated      [x]  Gait belt used during tx session      []other:       Number of Minutes/Billable Intervention  Therapeutic Exercise    ADL Self-care 60   Neuro Re-Ed    Therapeutic Activity    Group    Other:    TOTAL 60       Social History  Social/Functional History  Lives With: Spouse Jada Becker)  Type of Home: House  Home Layout: One level  Home Access: Stairs to enter without rails  Entrance Stairs - Number of Steps: 1 TERESITA  Bathroom Shower/Tub: Walk-in shower  Bathroom Toilet: Standard  Bathroom Equipment: Shower chair, Grab bars in shower  Bathroom Accessibility: Accessible (for quad cane)  Home Equipment: Marlyudmilala Ashley  Has the patient had two or more falls in the past year or any fall with injury in the past year?: Yes (had 2 falls, one on the 6th with face contusion requiring 3 stitches on nose; one on the 10th resulting in L humerus and hip fx's (not diagnosed until the 17th))  ADL

## 2023-11-26 VITALS
WEIGHT: 153.88 LBS | DIASTOLIC BLOOD PRESSURE: 73 MMHG | RESPIRATION RATE: 18 BRPM | OXYGEN SATURATION: 94 % | HEART RATE: 88 BPM | HEIGHT: 63 IN | SYSTOLIC BLOOD PRESSURE: 141 MMHG | TEMPERATURE: 97.7 F | BODY MASS INDEX: 27.27 KG/M2

## 2023-11-26 PROCEDURE — 97530 THERAPEUTIC ACTIVITIES: CPT

## 2023-11-26 PROCEDURE — 6370000000 HC RX 637 (ALT 250 FOR IP): Performed by: PHYSICAL MEDICINE & REHABILITATION

## 2023-11-26 PROCEDURE — 1280000000 HC REHAB R&B

## 2023-11-26 PROCEDURE — 6370000000 HC RX 637 (ALT 250 FOR IP): Performed by: STUDENT IN AN ORGANIZED HEALTH CARE EDUCATION/TRAINING PROGRAM

## 2023-11-26 PROCEDURE — 94761 N-INVAS EAR/PLS OXIMETRY MLT: CPT

## 2023-11-26 PROCEDURE — 6360000002 HC RX W HCPCS: Performed by: STUDENT IN AN ORGANIZED HEALTH CARE EDUCATION/TRAINING PROGRAM

## 2023-11-26 PROCEDURE — 97535 SELF CARE MNGMENT TRAINING: CPT

## 2023-11-26 PROCEDURE — 97116 GAIT TRAINING THERAPY: CPT

## 2023-11-26 PROCEDURE — 97110 THERAPEUTIC EXERCISES: CPT

## 2023-11-26 PROCEDURE — 94150 VITAL CAPACITY TEST: CPT

## 2023-11-26 RX ADMIN — PANTOPRAZOLE SODIUM 40 MG: 40 TABLET, DELAYED RELEASE ORAL at 06:06

## 2023-11-26 RX ADMIN — SERTRALINE HYDROCHLORIDE 100 MG: 50 TABLET ORAL at 20:50

## 2023-11-26 RX ADMIN — FLUTICASONE PROPIONATE 1 SPRAY: 50 SPRAY, METERED NASAL at 09:11

## 2023-11-26 RX ADMIN — ACETAMINOPHEN 650 MG: 325 TABLET ORAL at 06:06

## 2023-11-26 RX ADMIN — ACETAMINOPHEN 650 MG: 325 TABLET ORAL at 09:13

## 2023-11-26 RX ADMIN — ENOXAPARIN SODIUM 40 MG: 100 INJECTION SUBCUTANEOUS at 09:11

## 2023-11-26 RX ADMIN — ACETAMINOPHEN 650 MG: 325 TABLET ORAL at 17:04

## 2023-11-26 RX ADMIN — LEVOTHYROXINE SODIUM 137 MCG: 112 TABLET ORAL at 06:09

## 2023-11-26 RX ADMIN — ATORVASTATIN CALCIUM 10 MG: 10 TABLET, FILM COATED ORAL at 20:50

## 2023-11-26 RX ADMIN — ACETAMINOPHEN 650 MG: 325 TABLET ORAL at 20:50

## 2023-11-26 RX ADMIN — FLUTICASONE PROPIONATE 1 SPRAY: 50 SPRAY, METERED NASAL at 20:49

## 2023-11-26 ASSESSMENT — PAIN DESCRIPTION - ORIENTATION
ORIENTATION: LEFT
ORIENTATION: LEFT

## 2023-11-26 ASSESSMENT — PAIN DESCRIPTION - LOCATION
LOCATION: HIP;ARM
LOCATION: HIP;SHOULDER

## 2023-11-26 ASSESSMENT — PAIN SCALES - WONG BAKER
WONGBAKER_NUMERICALRESPONSE: 0

## 2023-11-26 ASSESSMENT — PAIN DESCRIPTION - DESCRIPTORS: DESCRIPTORS: DULL;DISCOMFORT

## 2023-11-26 ASSESSMENT — PAIN SCALES - GENERAL
PAINLEVEL_OUTOF10: 4
PAINLEVEL_OUTOF10: 5

## 2023-11-26 NOTE — PROGRESS NOTES
Physical Rehabilitation: OCCUPATIONAL THERAPY     [x] daily progress note       [] discharge       Patient Name:  Darin Block   :  1950 MRN: [id number]  Room:  [address] [place], [place] [address] Date of Admission: 2023  Rehabilitation Diagnosis:   Closed left hip fracture ([address]) [S72.002A]  Nondisplaced fracture of greater trochanter of left femur, initial encounter for closed fracture ([address]) [S72.115A]       Date 2023       Day of ARU Week:  7   Time IN/OUT 4627-8308   Individual Tx Minutes 40   Group Tx Minutes    Co-Treat Minutes 20 A cotreat was completed this date with  [x] PT    [] OT   [] ST      This pt requires simultaneous intervention of 2 skilled therapists to safely complete tasks to address complexity of deficits defined in the goals including:  []Attention   [x] Safety    []Problem Solving    []Sequencing     []Initiation    []Processing      []Recall of learned tasks  [] Communication  [] Self Feeding   []ADL's    []UE Function    []Motor planning   [x]Balance  []Energy Conservation   []Verbal cues   [] Tactile Cues  []Barriers     []Transfers   []Device Use  Pt's response to cotreat: tolerated well   Progress toward goals: high level balance without support with balloon taps by reaching out APRIL and crossing midline ~9 mins with 1 extended seated RB     Concurrent Tx Minutes    TOTAL Tx Time Mins 60   Variance Time    Variance Time []   Refusal due to:     []   Medical hold/reason:    []   Illness   []   Off Unit for test/procedure  []   Extra time needed to complete task  []   Therapeutic need  []   Other (specify):   Restrictions Restrictions/Precautions: General Precautions, Fall Risk (WBAT LLE, NWB LLE in sling, per Dr Farrah Hernandez no shoulder ROM but elbow/wrist/digit OK)         Communication with other providers: [x]   OK to see per nursing:     []   Spoke with team member regarding:      Subjective observations and cognitive status: Agreeable to OT therapy     Pain level/location:    /10 clockwise/counter clockwise, bicep/triceps curls x2 sets x10 reps. Demo SUP  +verbal/visual cues for pace and corrected techs. All therapeutic intervention performed c emphasis on RUE strengthening and endurance to increase strength for functional tasks / transfers. (spoke in person laura VELA to confirm pt performed with RUE only to ensure compliance c NWB LUE)   []   Standing Ther Ex (reps/sets):     []   Other:    Comments:      Patient/Caregiver Education and Training:   [x]   Adaptive Equipment Use  [x]   Bed Mobility/Transfer Technique/Safety  [x]   Energy Conservation Tips  []   Family training  [x]   Postural Awareness  [x]   Safety During Functional Activities  []   Reinforced Patient's Precautions   []   Progress was updated and reviewed in Rehabtracker with patient and/or family this         date.     Treatment Plan for Next Session: Continue OT POC        Treatment/Activity Tolerance:   [x] Tolerated treatment with no adverse effects    [] Patient limited by fatigue  [] Patient limited by pain   [] Patient limited by medical complications:    [] Adverse reaction to Tx:   [] Significant change in status    Safety:       []  bed alarm set    []  chair alarm set    []  Pt refused alarms                []  Telesitter activated      []  Gait belt used during tx session      []other:       Number of Minutes/Billable Intervention  Therapeutic Exercise 26   ADL Self-care 14   Neuro Re-Ed    Therapeutic Activity 20   Group    Other:    TOTAL 60       Social History  Social/Functional History  Lives With: Spouse Arlen Penaloza)  Type of Home: House  Home Layout: One level  Home Access: Stairs to enter without rails  Entrance Stairs - Number of Steps: 1 TERESITA  Bathroom Shower/Tub: Walk-in shower  Bathroom Toilet: Standard  Bathroom Equipment: Shower chair, Grab bars in shower  Bathroom Accessibility: Accessible (for quad cane)  Home Equipment: Lovena Mealing  Has the patient had two or more falls in the past year or any fall with injury

## 2023-11-26 NOTE — FLOWSHEET NOTE
[x] daily progress note       [] discharge       Patient Name:  Umesh Robertson   :  1950 MRN: [id number]  Room:  [address] [place], [place] [address] Date of Admission: 2023  Rehabilitation Diagnosis:   Closed left hip fracture ([address]) [S72.002A]  Nondisplaced fracture of greater trochanter of left femur, initial encounter for closed fracture ([address]) [S72.115A]       Date 2023       Day of ARU Week:  7   Time IN/-930  1378-2145   Individual Tx Minutes 60+40   Group Tx Minutes x   Co-Treat Minutes X 20  mins= 4536-8623 A cotreat was completed this date with  [] PT    [x] OT   [] ST      This pt requires simultaneous intervention of 2 skilled therapists to safely complete tasks to address complexity of deficits defined in the goals including:  []Attention   [x] Safety    []Problem Solving    []Sequencing     []Initiation    []Processing      []Recall of learned tasks  [] Communication  [] Self Feeding   []ADL's    []UE Function    []Motor planning   [x]Balance  []Energy Conservation   []Verbal cues   [] Tactile Cues  []Barriers     [x]Transfers   []Device Use  Pt's response to cotreat: tolerated unsupported standing with reaching 2  trials of 8-9  mins total.  Progress toward goals: dyn balance with cg     Concurrent Tx Minutes x   TOTAL Tx Time Mins 120   Variance Time    Variance Time []   Refusal due to:     []   Medical hold/reason:    []   Illness   []   Off Unit for test/procedure  []   Extra time needed to complete task  []   Therapeutic need  []   Other (specify):   Restrictions Restrictions/Precautions  Restrictions/Precautions: General Precautions, Fall Risk (WBAT LLE, NWB LLE in sling, per Dr Jamel Hernandez no shoulder ROM but elbow/wrist/digit OK)      Communication with other providers: [x]   OK to see per nursing:     [x]   Spoke with team member regarding:   pain meds   Subjective observations and cognitive status: Pt states the dressing change went well yesterday. Reports hip squeezes helped.   Pain more

## 2023-11-27 PROCEDURE — 97110 THERAPEUTIC EXERCISES: CPT

## 2023-11-27 PROCEDURE — 97116 GAIT TRAINING THERAPY: CPT

## 2023-11-27 PROCEDURE — 94150 VITAL CAPACITY TEST: CPT

## 2023-11-27 PROCEDURE — 94664 DEMO&/EVAL PT USE INHALER: CPT

## 2023-11-27 PROCEDURE — 97535 SELF CARE MNGMENT TRAINING: CPT

## 2023-11-27 PROCEDURE — 6360000002 HC RX W HCPCS: Performed by: STUDENT IN AN ORGANIZED HEALTH CARE EDUCATION/TRAINING PROGRAM

## 2023-11-27 PROCEDURE — 6370000000 HC RX 637 (ALT 250 FOR IP): Performed by: STUDENT IN AN ORGANIZED HEALTH CARE EDUCATION/TRAINING PROGRAM

## 2023-11-27 PROCEDURE — 97530 THERAPEUTIC ACTIVITIES: CPT

## 2023-11-27 PROCEDURE — 94761 N-INVAS EAR/PLS OXIMETRY MLT: CPT

## 2023-11-27 PROCEDURE — 1280000000 HC REHAB R&B

## 2023-11-27 PROCEDURE — 99232 SBSQ HOSP IP/OBS MODERATE 35: CPT | Performed by: PHYSICAL MEDICINE & REHABILITATION

## 2023-11-27 PROCEDURE — 6370000000 HC RX 637 (ALT 250 FOR IP): Performed by: PHYSICAL MEDICINE & REHABILITATION

## 2023-11-27 RX ADMIN — ACETAMINOPHEN 650 MG: 325 TABLET ORAL at 20:38

## 2023-11-27 RX ADMIN — PANTOPRAZOLE SODIUM 40 MG: 40 TABLET, DELAYED RELEASE ORAL at 05:49

## 2023-11-27 RX ADMIN — FLUTICASONE PROPIONATE 1 SPRAY: 50 SPRAY, METERED NASAL at 08:23

## 2023-11-27 RX ADMIN — ACETAMINOPHEN 650 MG: 325 TABLET ORAL at 17:09

## 2023-11-27 RX ADMIN — LEVOTHYROXINE SODIUM 137 MCG: 112 TABLET ORAL at 05:49

## 2023-11-27 RX ADMIN — SERTRALINE HYDROCHLORIDE 100 MG: 50 TABLET ORAL at 20:38

## 2023-11-27 RX ADMIN — ENOXAPARIN SODIUM 40 MG: 100 INJECTION SUBCUTANEOUS at 08:24

## 2023-11-27 RX ADMIN — ATORVASTATIN CALCIUM 10 MG: 10 TABLET, FILM COATED ORAL at 20:38

## 2023-11-27 RX ADMIN — ACETAMINOPHEN 650 MG: 325 TABLET ORAL at 05:49

## 2023-11-27 RX ADMIN — FLUTICASONE PROPIONATE 1 SPRAY: 50 SPRAY, METERED NASAL at 21:24

## 2023-11-27 RX ADMIN — ACETAMINOPHEN 650 MG: 325 TABLET ORAL at 10:53

## 2023-11-27 ASSESSMENT — PAIN SCALES - WONG BAKER
WONGBAKER_NUMERICALRESPONSE: 0
WONGBAKER_NUMERICALRESPONSE: 2
WONGBAKER_NUMERICALRESPONSE: 0

## 2023-11-27 ASSESSMENT — PAIN DESCRIPTION - LOCATION
LOCATION: ARM
LOCATION: HIP;SHOULDER

## 2023-11-27 ASSESSMENT — PAIN DESCRIPTION - DESCRIPTORS
DESCRIPTORS: ACHING;TINGLING
DESCRIPTORS: ACHING

## 2023-11-27 ASSESSMENT — PAIN SCALES - GENERAL
PAINLEVEL_OUTOF10: 0
PAINLEVEL_OUTOF10: 1
PAINLEVEL_OUTOF10: 3
PAINLEVEL_OUTOF10: 0
PAINLEVEL_OUTOF10: 0
PAINLEVEL_OUTOF10: 4
PAINLEVEL_OUTOF10: 0

## 2023-11-27 ASSESSMENT — PAIN DESCRIPTION - ORIENTATION
ORIENTATION: LEFT
ORIENTATION: LEFT

## 2023-11-27 ASSESSMENT — PAIN DESCRIPTION - FREQUENCY: FREQUENCY: INTERMITTENT

## 2023-11-27 ASSESSMENT — PAIN DESCRIPTION - ONSET: ONSET: ON-GOING

## 2023-11-27 ASSESSMENT — PAIN - FUNCTIONAL ASSESSMENT: PAIN_FUNCTIONAL_ASSESSMENT: PREVENTS OR INTERFERES SOME ACTIVE ACTIVITIES AND ADLS

## 2023-11-27 ASSESSMENT — PAIN DESCRIPTION - PAIN TYPE: TYPE: ACUTE PAIN

## 2023-11-27 NOTE — PROGRESS NOTES
Occupational Therapy    Physical Rehabilitation: OCCUPATIONAL THERAPY     [x] daily progress note       [] discharge       Patient Name:  Connor Johnson   :  1950 MRN: [id number]  Room:  [address] [place] [address] Date of Admission: 2023  Rehabilitation Diagnosis:   Closed left hip fracture ([address]) [S72.002A]  Nondisplaced fracture of greater trochanter of left femur, initial encounter for closed fracture ([address]) [S72.115A]       Date 2023       Day of ARU Week:  1   Time IN//1042  1255/1355   Individual Tx Minutes 60+60   Group Tx Minutes    Co-Treat Minutes    Concurrent Tx Minutes    TOTAL Tx Time Mins 120   Variance Time    Variance Time []   Refusal due to:     []   Medical hold/reason:    []   Illness   []   Off Unit for test/procedure  []   Extra time needed to complete task  []   Therapeutic need  []   Other (specify):   Restrictions Restrictions/Precautions: General Precautions, Fall Risk (WBAT LLE, NWB LLE in sling, per Dr Tanesha Jane no shoulder ROM but elbow/wrist/digit OK)         Communication with other providers: [x]   OK to see per nursing:     []   Spoke with team member regarding:      Subjective observations and cognitive status: AM: Pt in bed on approach, pleasant and agreeable to ADL session including full shower    PM: Pt in bed, agreeable to tx session. Pain level/location:   10       Location: L hip, very minimal in L shoulder   Discharge recommendations  Anticipated discharge date:    Destination: []home alone   []home alone w assist prn   [x] home w/ family    [] Continuous supervision       []SNF    [] Assisted living     [] Other:   Continued therapy: []HHC OT  []OUTPATIENT  OT   [x] No Further OT  Equipment needs: [place] [facility] [place]      Connor Johnson requires a 3 in 1 bedside commode due to being unable to use the toilet within the home  And confined to a single room.          ADLs:     Eating: Eating  Assistance Needed: Independent  Comment: ind to open packages, feed reviewed in Rehabtracker with patient and/or family this         date. Treatment Plan for Next Session: Continue OT POC, perform UB dressing       Assessment: This pt demonstrated a  positive response to today's treatment as evidenced by meeting most goals. The patient is making progress toward established goals as evidenced by QI scores. Ongoing deficits are observed in the areas of balance, endurance and continued focus on this is recommended.        Treatment/Activity Tolerance:   [x] Tolerated treatment with no adverse effects    [] Patient limited by fatigue  [] Patient limited by pain   [] Patient limited by medical complications:    [] Adverse reaction to Tx:   [] Significant change in status    Safety:       []  bed alarm set    [x]  chair alarm set    []  Pt refused alarms                []  Telesitter activated      [x]  Gait belt used during tx session      []other:       Number of Minutes/Billable Intervention  Therapeutic Exercise 20   ADL Self-care 60   Neuro Re-Ed    Therapeutic Activity 40   Group    Other:    TOTAL 120       Social History  Social/Functional History  Lives With: Spouse Marielle Ayala)  Type of Home: House  Home Layout: One level  Home Access: Stairs to enter without rails  Entrance Stairs - Number of Steps: 1 TERESITA  Bathroom Shower/Tub: Walk-in shower  Bathroom Toilet: Standard  Bathroom Equipment: Shower chair, Grab bars in shower  Bathroom Accessibility: Accessible (for quad cane)  Home Equipment: Yola Webster  Has the patient had two or more falls in the past year or any fall with injury in the past year?: Yes (had 2 falls, one on the 6th with face contusion requiring 3 stitches on nose; one on the 10th resulting in L humerus and hip fx's (not diagnosed until the 17th))  ADL Assistance: [address] Independent  Homemaking Responsibilities: Yes (primary for all)  Ambulation Assistance: Independent  Transfer Assistance: Independent  Active : Yes  Mode of

## 2023-11-27 NOTE — PATIENT CARE CONFERENCE
ACUTE REHAB TEAM CONFERENCE SUMMARY   [address] [facility]    NAME: Daniella Claire  : 1950 ADMIT DATE: 2023    Rehab Admitting Dx:Closed left hip fracture ([address]) [S72.002A]  Nondisplaced fracture of greater trochanter of left femur, initial encounter for closed fracture ([address]) [S72.115A]  Patient Comorbid Conditions: Active Hospital Problems    Diagnosis Date Noted    Intertrochanteric fracture of left hip, closed, initial encounter ([address]) Claudette Segura 2023    Traumatic closed fracture of proximal end of left humerus with minimal displacement [S42.202A] 2023    Uncontrolled pain [R52] 2023    Generalized weakness [R53.1] 2023    Gait disturbance [R26.9] 2023    Acute blood loss anemia [D62] 2023    Depression with anxiety [F41.8] 2023    Acquired hypothyroidism [E03.9] 2023    Mixed hyperlipidemia [E78.2] 2023    Leukocytosis [D72.829] 2023    Closed displaced fracture of greater trochanter of left femur ([address]) Keiry Singer 2023     Date: 2023    CASE MANAGEMENT  Current issues/needs regarding patient and family discharge status: 1-level, 1 TERESITA. Rec DME: quad cane, BSC     Jacklyn Oliva requires a 3 in 1 bedside commode due to being unable to use the toilet within the home and confined to a single room. Daniella Claire requires a large based quad cane due to impaired ambulation and for increased stability in order to participate in or complete daily living tasks of: bathing, toileting, personal cares, ambulating, grooming, hygiene, dressing upper body, dressing lower body, and meal preparation. The patient is able to safely use the quad cane and the functional mobility deficit can be sufficiently resolved.      Patient and family goal: Home with spouse and [facility] PT    PHYSICAL THERAPY (Updated in QI)  Short Term Goals  Time Frame for Short Term Goals: 7 days STG=LTG  Short Term Goal 1: pt

## 2023-11-27 NOTE — DISCHARGE INSTR - ACTIVITY
Pt is discharging to home with referral [facility]  [facility] [address]  [phone]    Pt will discharge home with medical supplies from   [facility], [address] [place] [address] Drive  [phone]

## 2023-11-27 NOTE — PROGRESS NOTES
Physical Therapy  [x] daily progress note       [] discharge       Patient Name:  Umesh Robertson   :  1950 MRN: [id number]  Room:  [address] [place], [place] [address] Date of Admission: 2023  Rehabilitation Diagnosis:   Closed left hip fracture ([address]) [S72.002A]  Nondisplaced fracture of greater trochanter of left femur, initial encounter for closed fracture ([address]) [S72.115A]       Date 2023       Day of ARU Week:  1   Time IN//934   Individual Tx Minutes 60   Group Tx Minutes    Co-Treat Minutes    Concurrent Tx Minutes    TOTAL Tx Time Mins 60   Variance Time    Variance Time []   Refusal due to:     []   Medical hold/reason:    []   Illness   []   Off Unit for test/procedure  []   Extra time needed to complete task  []   Therapeutic need  []   Other (specify):   Restrictions Restrictions/Precautions  Restrictions/Precautions: General Precautions, Fall Risk (WBAT LLE, NWB LLE in sling, per Dr Jamel Hernandez no shoulder ROM but elbow/wrist/digit OK)      Interdisciplinary communication [x]   Cleared for therapy per nursing     []   RN notified about issues during session  []   RN updated on pt performance  []   Spoke with   []   Spoke with OT  [x]   Spoke with MD-pt ok'd for mod I in room. OT to educate pt in this during their pm session  []   Other:    Subjective observations and cognitive status: Pt sitting EOB, has had meds. Pt states she is feeling more confident with [facility].       Pain level/location:   3 /10  Lhip 4/10 L shoulder        Discharge recommendations  Anticipated discharge date:    Destination: []home alone   []home alone with assist PRN     [x] home w/ family      [] Continuous supervision  []SNF    [] Assisted living     [] Other:  Continued therapy: []HHC PT  [x]OUTPATIENT  PT   [] No Further PT  []SNF PT  Caregiver training recommended: []Yes  [x] No   Equipment needs: Umesh Robertson requires a large based quad cane due to impaired ambulation and for increased stability in training, Neuromuscular re-education, Pain management, Safety education & training, Patient/Caregiver education & training, Therapeutic activities, Home exercise program, Positioning, Group Therapy    Electronically signed by   Prince Munguia PT,  11/27/2023, 9:30 AM

## 2023-11-27 NOTE — PROGRESS NOTES
Comprehensive Nutrition Assessment    Type and Reason for Visit:  Reassess    Nutrition Recommendations/Plan:   Continue regular diet  Continue to offer standard oral nutrition supplements  Will continue to follow up during stay      Malnutrition Assessment:  Malnutrition Status:  No malnutrition (11/21/23 1258)    Context:  Acute Illness       Nutrition Assessment:    Remains with good appetite and meal intake usually %. Consuming standard oral nutrition supplement and will continue at home. Adequate intake during stay, will follow at low nutrition risk at this time. Nutrition Related Findings:    up at edge of bed eating lunch, able to feed self,  friends providing some extra food and snacks. Wound Type: Surgical Incision       Current Nutrition Intake & Therapies:    Average Meal Intake: %  Average Supplements Intake: %  ADULT DIET; Regular  ADULT ORAL NUTRITION SUPPLEMENT; Breakfast; Standard High Calorie/High Protein Oral Supplement    Anthropometric Measures:  Height: 160 cm (5' 3\")  Ideal Body Weight (IBW): 115 lbs (52 kg)    Admission Body Weight: 69.7 kg (153 lb 10.6 oz)  Current Body Weight: 69.2 kg (152 lb 8.9 oz), 134 % IBW. Weight Source: Bed Scale  Current BMI (kg/m2): 27  Usual Body Weight: 72.1 kg (158 lb 15.2 oz) (hx May 2023 MD office)  % Weight Change (Calculated): -3.1  Weight Adjustment For: No Adjustment                 BMI Categories: Overweight (BMI 25.0-29. 9)    Estimated Daily Nutrient Needs:  Energy Requirements Based On: Formula  Weight Used for Energy Requirements: Current  Energy (kcal/day): 0319-7866 (mifflin st jeor)  Weight Used for Protein Requirements: Ideal  Protein (g/day): 62-73 (1.2-1.4 g/kg)  Method Used for Fluid Requirements: ml/Kg  Fluid (ml/day): 2000 (30 ml/kg)    Nutrition Diagnosis:   Predicted inadequate energy intake related to increase demand for energy/nutrients as evidenced by other (comment) (recovery from fall, healing fractures)    Nutrition Interventions:   Food and/or Nutrient Delivery: Continue Current Diet, Continue Oral Nutrition Supplement  Nutrition Education/Counseling: No recommendation at this time  Coordination of Nutrition Care: Continue to monitor while inpatient  Plan of Care discussed with: patient    Goals:  Previous Goal Met: Progressing toward Goal(s)  Goals: PO intake 75% or greater, by next RD assessment       Nutrition Monitoring and Evaluation:   Behavioral-Environmental Outcomes: None Identified  Food/Nutrient Intake Outcomes: Diet Advancement/Tolerance, Food and Nutrient Intake, Supplement Intake  Physical Signs/Symptoms Outcomes: Biochemical Data, Meal Time Behavior, Skin, Weight    Discharge Planning:    Continue Oral Nutrition Supplement     Fabrizio López, [address] [facility] [place],   Contact: [phone]

## 2023-11-27 NOTE — DISCHARGE INSTRUCTIONS
Your information:  Name: Lesvia Mcarthur  : 1950    Your instructions:    Pt is discharging to home with referral to [address] [place], [place] [address]  [phone]    Pt will discharge home with medical supplies from   [address] [place] [address]  [phone]    What to do after you leave the hospital:    Recommended diet: {diet:09508}    Recommended activity: {discharge activity:81271}        The following personal items were collected during your admission and were returned to you:    Belongings  Dental Appliances: Other (Comment) (dental implants)  Vision - Corrective Lenses: Eyeglasses  Hearing Aid: None  Clothing: Footwear, Pants, Shirt, Socks, Undergarments, At bedside  Jewelry: Ring, At bedside (2)  Body Piercings Removed: No  Electronic Devices: Cell Phone,   Weapons (Notify Protective Services/Security): None  Home Medications: None  Valuables Given To: Other (Comment) (at bedside)  Provide Name(s) of Who Valuable(s) Were Given To: patient    Information obtained by:  By signing below, I understand that if any problems occur once I leave the hospital I am to contact ***. I understand and acknowledge receipt of the instructions indicated above.

## 2023-11-27 NOTE — CARE COORDINATION
Spoke with patient to discuss discharge plans and obtain outpatient choice. Patient chose [address] [facility]. Referral faxed to [facility]. Faxed order for quad cane and BSC to [facility].

## 2023-11-28 LAB
ANION GAP SERPL CALCULATED.3IONS-SCNC: 13 MMOL/L (ref 4–16)
BUN SERPL-MCNC: 28 MG/DL (ref 6–23)
CALCIUM SERPL-MCNC: 9 MG/DL (ref 8.3–10.6)
CHLORIDE BLD-SCNC: 105 MMOL/L (ref 99–110)
CO2: 22 MMOL/L (ref 21–32)
CREAT SERPL-MCNC: 0.7 MG/DL (ref 0.6–1.1)
GFR SERPL CREATININE-BSD FRML MDRD: >60 ML/MIN/1.73M2
GLUCOSE SERPL-MCNC: 84 MG/DL (ref 70–99)
HCT VFR BLD CALC: 36.3 % (ref 37–47)
HEMOGLOBIN: 11.2 GM/DL (ref 12.5–16)
MCH RBC QN AUTO: 30.9 PG (ref 27–31)
MCHC RBC AUTO-ENTMCNC: 30.9 % (ref 32–36)
MCV RBC AUTO: 100.3 FL (ref 78–100)
PDW BLD-RTO: 14.8 % (ref 11.7–14.9)
PLATELET # BLD: 533 K/CU MM (ref 140–440)
PMV BLD AUTO: 9.2 FL (ref 7.5–11.1)
POTASSIUM SERPL-SCNC: 4.3 MMOL/L (ref 3.5–5.1)
RBC # BLD: 3.62 M/CU MM (ref 4.2–5.4)
SODIUM BLD-SCNC: 140 MMOL/L (ref 135–145)
WBC # BLD: 13.1 K/CU MM (ref 4–10.5)

## 2023-11-28 PROCEDURE — 94150 VITAL CAPACITY TEST: CPT

## 2023-11-28 PROCEDURE — 6370000000 HC RX 637 (ALT 250 FOR IP): Performed by: STUDENT IN AN ORGANIZED HEALTH CARE EDUCATION/TRAINING PROGRAM

## 2023-11-28 PROCEDURE — 99233 SBSQ HOSP IP/OBS HIGH 50: CPT | Performed by: PHYSICAL MEDICINE & REHABILITATION

## 2023-11-28 PROCEDURE — 85027 COMPLETE CBC AUTOMATED: CPT

## 2023-11-28 PROCEDURE — 80048 BASIC METABOLIC PNL TOTAL CA: CPT

## 2023-11-28 PROCEDURE — 97535 SELF CARE MNGMENT TRAINING: CPT

## 2023-11-28 PROCEDURE — 6360000002 HC RX W HCPCS: Performed by: STUDENT IN AN ORGANIZED HEALTH CARE EDUCATION/TRAINING PROGRAM

## 2023-11-28 PROCEDURE — 97116 GAIT TRAINING THERAPY: CPT

## 2023-11-28 PROCEDURE — 6370000000 HC RX 637 (ALT 250 FOR IP): Performed by: PHYSICAL MEDICINE & REHABILITATION

## 2023-11-28 PROCEDURE — 36415 COLL VENOUS BLD VENIPUNCTURE: CPT

## 2023-11-28 PROCEDURE — 97530 THERAPEUTIC ACTIVITIES: CPT

## 2023-11-28 PROCEDURE — 97110 THERAPEUTIC EXERCISES: CPT

## 2023-11-28 PROCEDURE — 94761 N-INVAS EAR/PLS OXIMETRY MLT: CPT

## 2023-11-28 PROCEDURE — 1280000000 HC REHAB R&B

## 2023-11-28 RX ORDER — LEVOTHYROXINE SODIUM 137 UG/1
137 TABLET ORAL
Qty: 30 TABLET | Refills: 0 | Status: SHIPPED | OUTPATIENT
Start: 2023-11-29

## 2023-11-28 RX ORDER — FLUTICASONE PROPIONATE 50 MCG
1 SPRAY, SUSPENSION (ML) NASAL 2 TIMES DAILY
Qty: 1 EACH | Refills: 0 | Status: SHIPPED | OUTPATIENT
Start: 2023-11-28 | End: 2023-12-28

## 2023-11-28 RX ORDER — ENOXAPARIN SODIUM 100 MG/ML
40 INJECTION SUBCUTANEOUS DAILY
Qty: 14 EACH | Refills: 0 | Status: SHIPPED | OUTPATIENT
Start: 2023-11-29 | End: 2023-12-13

## 2023-11-28 RX ORDER — ACETAMINOPHEN 325 MG/1
650 TABLET ORAL EVERY 6 HOURS PRN
Status: DISCONTINUED | OUTPATIENT
Start: 2023-11-28 | End: 2023-11-29 | Stop reason: HOSPADM

## 2023-11-28 RX ADMIN — PANTOPRAZOLE SODIUM 40 MG: 40 TABLET, DELAYED RELEASE ORAL at 05:59

## 2023-11-28 RX ADMIN — ACETAMINOPHEN 650 MG: 325 TABLET ORAL at 21:06

## 2023-11-28 RX ADMIN — ATORVASTATIN CALCIUM 10 MG: 10 TABLET, FILM COATED ORAL at 21:07

## 2023-11-28 RX ADMIN — ACETAMINOPHEN 650 MG: 325 TABLET ORAL at 05:59

## 2023-11-28 RX ADMIN — ACETAMINOPHEN 650 MG: 325 TABLET ORAL at 09:45

## 2023-11-28 RX ADMIN — LEVOTHYROXINE SODIUM 137 MCG: 112 TABLET ORAL at 05:59

## 2023-11-28 RX ADMIN — ENOXAPARIN SODIUM 40 MG: 100 INJECTION SUBCUTANEOUS at 09:45

## 2023-11-28 RX ADMIN — SERTRALINE HYDROCHLORIDE 100 MG: 50 TABLET ORAL at 21:07

## 2023-11-28 RX ADMIN — FLUTICASONE PROPIONATE 1 SPRAY: 50 SPRAY, METERED NASAL at 21:07

## 2023-11-28 ASSESSMENT — PAIN - FUNCTIONAL ASSESSMENT
PAIN_FUNCTIONAL_ASSESSMENT: ACTIVITIES ARE NOT PREVENTED
PAIN_FUNCTIONAL_ASSESSMENT: ACTIVITIES ARE NOT PREVENTED
PAIN_FUNCTIONAL_ASSESSMENT: PREVENTS OR INTERFERES SOME ACTIVE ACTIVITIES AND ADLS
PAIN_FUNCTIONAL_ASSESSMENT: ACTIVITIES ARE NOT PREVENTED

## 2023-11-28 ASSESSMENT — PAIN DESCRIPTION - PAIN TYPE
TYPE: ACUTE PAIN
TYPE: ACUTE PAIN
TYPE: SURGICAL PAIN

## 2023-11-28 ASSESSMENT — PAIN SCALES - GENERAL
PAINLEVEL_OUTOF10: 5
PAINLEVEL_OUTOF10: 2
PAINLEVEL_OUTOF10: 1
PAINLEVEL_OUTOF10: 2
PAINLEVEL_OUTOF10: 5

## 2023-11-28 ASSESSMENT — PAIN DESCRIPTION - DESCRIPTORS
DESCRIPTORS: ACHING
DESCRIPTORS: DISCOMFORT

## 2023-11-28 ASSESSMENT — PAIN DESCRIPTION - ONSET
ONSET: GRADUAL
ONSET: GRADUAL

## 2023-11-28 ASSESSMENT — PAIN DESCRIPTION - FREQUENCY
FREQUENCY: INTERMITTENT
FREQUENCY: INTERMITTENT

## 2023-11-28 ASSESSMENT — PAIN DESCRIPTION - LOCATION
LOCATION: ARM;HIP
LOCATION: SHOULDER
LOCATION: ARM
LOCATION: SHOULDER

## 2023-11-28 ASSESSMENT — PAIN DESCRIPTION - ORIENTATION
ORIENTATION: LEFT

## 2023-11-28 NOTE — PROGRESS NOTES
Physical Therapy  [x] daily progress note       [x] discharge       Patient Name:  Tania Rouse   :  1950 MRN: [id number]  Room:  [address] [place], [place] [address]A Date of Admission: 2023  Rehabilitation Diagnosis:   Closed left hip fracture ([address]) [S72.002A]  Nondisplaced fracture of greater trochanter of left femur, initial encounter for closed fracture ([address]) [S72.115A]       Date 2023       Day of ARU Week:  2   Time IN/OUT 1100/1200   Individual Tx Minutes 60   Group Tx Minutes    Co-Treat Minutes    Concurrent Tx Minutes    TOTAL Tx Time Mins 60   Variance Time    Variance Time []   Refusal due to:     []   Medical hold/reason:    []   Illness   []   Off Unit for test/procedure  []   Extra time needed to complete task  []   Therapeutic need  []   Other (specify):   Restrictions Restrictions/Precautions  Restrictions/Precautions: General Precautions, Fall Risk (WBAT LLE, NWB LLE in sling, per Dr Wesley Rubio no shoulder ROM but elbow/wrist/digit OK)      Interdisciplinary communication [x]   Cleared for therapy per nursing     []   RN notified about issues during session  []   RN updated on pt performance  []   Spoke with   []   Spoke with OT  []   Spoke with MD  []   Other:    Subjective observations and cognitive status: Handoff from OT, pt agreeable. Pain level/location:   3-4 /10       Location: L hip and shoulder   Discharge recommendations  Anticipated discharge date:  ?   Destination: []home alone   []home alone with assist PRN     [x] home w/ family      [] Continuous supervision  []SNF    [] Assisted living     [] Other:  Continued therapy: []HHC PT  [x]OUTPATIENT PT   [] No Further PT  []SNF PT  Caregiver training recommended: []Yes  [x] No   Equipment needs: LBQC     PT IRF-CHUNG scores and goals for discharge assessment:   Roll Left and Right  Comment: n/a due to L humeral fx  Reason if not Attempted: Not attempted due to medical condition or safety concerns  CARE Score: Goals:  (Update in navigator)  Short Term Goals  Time Frame for Short Term Goals: 7 days STG=LTG  Short Term Goal 1: pt will perform bed mobility with mod I  Short Term Goal 2: pt will perform all functional transfers mod I  Short Term Goal 3: Pt will ambulate 150' on level surfaces and 10' on unlevel surfaces with LRAD mod I  Short Term Goal 4: pt will ascend/descned curb step with LRAD and 12 steps with rail with mod I  Short Term Goal 5: Pt will  light object from floor with mod I with reacher:   :        Plan of Care                                                                              Times per week: 5 days per week for a minimum of 60 minutes/day plus group as appropriate for 60 minutes.   Treatment to include Current Treatment Recommendations: Strengthening, ROM, Balance training, Functional mobility training, Transfer training, ADL/Self-care training, IADL training, Endurance training, Equipment evaluation, education, & procurement, Wheelchair mobility training, Gait training, Stair training, Neuromuscular re-education, Pain management, Safety education & training, Patient/Caregiver education & training, Therapeutic activities, Home exercise program, Positioning, Group Therapy    Electronically signed by   Sarah Beth Amaro PT,   11/28/2023, 11:47 AM

## 2023-11-28 NOTE — PROGRESS NOTES
Josefine Osgood    : 1950  Acct #: [de-identified]  MRN: [id number]              PM&R Progress Note      Admitting diagnosis: Left greater trochanteric hip fracture with extension into the intertrochanteric region ([facility] 8.4)     Comorbid diagnoses impacting rehabilitation: Minimally displaced intra-articular fracture of the proximal left humerus, uncontrolled pain, generalized weakness, gait disturbance, acute blood loss anemia, acquired hypothyroidism, leukocytosis, mixed hyperlipidemia, depression with anxiety     Chief complaint: She is feeling more confident with movement each day. Some hip pain as expected. No new tingling in her left hand. Prior (baseline) level of function: Independent.     Current level of function:         Current  IRF-CHUNG and Goals:   Occupational Therapy:    Short Term Goals  Time Frame for Short Term Goals: STGs=LTGs :   Long Term Goals  Time Frame for Long Term Goals : 7-10 days or until d/c  Long Term Goal 1: Pt will complete grooming tasks Ind  Long Term Goal 2: Pt will complete total body bathing mod I using AE PRN  Long Term Goal 3: Pt will complete UB dressing c mod I including managing sling  Long Term Goal 4: Pt will complete LB dressing mod I using AE PRN  Long Term Goal 5: Pt will doff/don footwear mod I using AE PRN  Additional Goals?: Yes  Long Term Goal 6: Pt will complete toileting tasks Ind  Long Term Goal 7: Pt will complete functional transfers (bed, chair, toilet, shower) c DME PRN and mod I  Long Term Goal 8: Pt will perform therex/therax to facilitate increased strength/endurance/ax tolerance (c emphasis on dynamic standing balance/tolerance >10 mins, RUE endurance) c SBA  Long Term Goal 9: Pt will complete simple homemaking tasks c DME PRN and mod I :                                       Eating: Eating  Assistance Needed: Independent  Comment: ind to open packages, feed self  CARE Score: 6  Discharge Goal: Independent       Oral Hygiene: Oral

## 2023-11-28 NOTE — CARE COORDINATION
LSW met with patient and provided written communication following Care Conference. LSW informed patient that quad cane, BSC, [facility] PT have been ordered. Notified of discharge date change. Patient verbalized understanding. Whiteboard updated. Discussed discharge with patient and provided a copy of the Important Message from Medicare form signed upon admission. Patient verbalized understanding. Provided patient with upcoming appointments. BIMS completed. D/C Plan:  Estimated Date: Nov 29  DME:  quad cane, BSC [facility] DME)  Outpatient   PT, OT ([facility])  To:   Home with spouse (family will transport)

## 2023-11-28 NOTE — PROGRESS NOTES
safety concerns  CARE Score: 88  Discharge Goal: Not Attempted  Lying to Sitting on Side of Bed  Assistance Needed: Independent  Comment: with rail  CARE Score: 6  Discharge Goal: Independent    Transfers:    Sit to Stand  Assistance Needed: Independent  Comment: with quad cane  CARE Score: 6  Discharge Goal: Independent  Chair/Bed-to-Chair Transfer  Assistance Needed: Independent  Comment: with LBQC  CARE Score: 6  Discharge Goal: Independent     Car Transfer  Assistance Needed: Independent  Comment: approach with LBQC  CARE Score: 6  Discharge Goal: Independent    Ambulation:    Walking Ability  Does the Patient Walk?: Yes     Walk 10 Feet  Assistance Needed: Independent  Comment: LBQC  CARE Score: 6  Discharge Goal: Independent     Walk 50 Feet with Two Turns  Assistance Needed: Independent  Comment: EARL, pt consistently 2 point gait  CARE Score: 6  Discharge Goal: Independent     Walk 150 Feet  Assistance Needed: Independent  Comment: LOTTIEQC, consistent 2 point gait  CARE Score: 6  Discharge Goal: Independent     Walking 10 Feet on Uneven Surfaces  Assistance Needed: Independent  Comment: LOTTIEQC with good safety awareness  CARE Score: 6  Discharge Goal: Independent     1 Step (Curb)  Assistance Needed: Independent  Comment: EARL, performed twice with good adjustments in set up  CARE Score: 6  Discharge Goal: Independent     4 Steps  Assistance Needed: Independent  Comment: mod I with rail on R ascending and descending  CARE Score: 6  Discharge Goal: Independent     12 Steps  Assistance Needed: Independent  Comment: R rail ascending and descending with step-to pattern  CARE Score: 6  Discharge Goal: Independent       Wheelchair:  w/c Ability: Wheelchair Ability  Uses a Wheelchair and/or Scooter?: No                Balance:        Object: Picking Up Object  Assistance Needed: Independent  Comment: with reacher  CARE Score: 6  Discharge Goal: Independent    I      Exam:    Blood pressure 136/72, pulse 76,

## 2023-11-28 NOTE — PROGRESS NOTES
Goal 4: Pt will complete LB dressing mod I using AE PRN  Long Term Goal 5: Pt will doff/don footwear mod I using AE PRN  Additional Goals?: Yes  Long Term Goal 6: Pt will complete toileting tasks Ind  Long Term Goal 7: Pt will complete functional transfers (bed, chair, toilet, shower) c DME PRN and mod I  Long Term Goal 8: Pt will perform therex/therax to facilitate increased strength/endurance/ax tolerance (c emphasis on dynamic standing balance/tolerance >10 mins, RUE endurance) c SBA  Long Term Goal 9: Pt will complete simple homemaking tasks c DME PRN and mod I:        Plan of Care                                                                              Times per week: 5 days per week for a minimum of 60 minutes/day plus group as appropriate for 60 minutes.   Treatment to include Occupational Therapy Plan  Current Treatment Recommendations: Functional mobility training, Balance training, Endurance training, Pain management, Safety education & training, Patient/Caregiver education & training, Equipment evaluation, education, & procurement, Self-Care / ADL, Home management training    Electronically signed by   ROSA Moreland,  11/28/2023, 1:56 PM

## 2023-11-29 VITALS
DIASTOLIC BLOOD PRESSURE: 70 MMHG | BODY MASS INDEX: 27.15 KG/M2 | HEART RATE: 78 BPM | HEIGHT: 63 IN | OXYGEN SATURATION: 92 % | SYSTOLIC BLOOD PRESSURE: 132 MMHG | RESPIRATION RATE: 18 BRPM | TEMPERATURE: 97.6 F | WEIGHT: 153.22 LBS

## 2023-11-29 PROCEDURE — 6370000000 HC RX 637 (ALT 250 FOR IP): Performed by: STUDENT IN AN ORGANIZED HEALTH CARE EDUCATION/TRAINING PROGRAM

## 2023-11-29 PROCEDURE — 94664 DEMO&/EVAL PT USE INHALER: CPT

## 2023-11-29 PROCEDURE — 94761 N-INVAS EAR/PLS OXIMETRY MLT: CPT

## 2023-11-29 PROCEDURE — 99239 HOSP IP/OBS DSCHRG MGMT >30: CPT | Performed by: PHYSICAL MEDICINE & REHABILITATION

## 2023-11-29 PROCEDURE — 6360000002 HC RX W HCPCS: Performed by: STUDENT IN AN ORGANIZED HEALTH CARE EDUCATION/TRAINING PROGRAM

## 2023-11-29 PROCEDURE — 94150 VITAL CAPACITY TEST: CPT

## 2023-11-29 RX ADMIN — FLUTICASONE PROPIONATE 1 SPRAY: 50 SPRAY, METERED NASAL at 08:58

## 2023-11-29 RX ADMIN — LEVOTHYROXINE SODIUM 137 MCG: 112 TABLET ORAL at 06:46

## 2023-11-29 RX ADMIN — ENOXAPARIN SODIUM 40 MG: 100 INJECTION SUBCUTANEOUS at 08:57

## 2023-11-29 RX ADMIN — PANTOPRAZOLE SODIUM 40 MG: 40 TABLET, DELAYED RELEASE ORAL at 06:46

## 2023-11-29 ASSESSMENT — PAIN SCALES - GENERAL
PAINLEVEL_OUTOF10: 0
PAINLEVEL_OUTOF10: 0

## 2023-11-29 NOTE — PLAN OF CARE
Problem: Discharge Planning  Goal: Discharge to home or other facility with appropriate resources  Outcome: Progressing  Flowsheets (Taken 11/28/2023 2045)  Discharge to home or other facility with appropriate resources: Identify discharge learning needs (meds, wound care, etc)     Problem: Safety - Adult  Goal: Free from fall injury  Outcome: Progressing     Problem: ABCDS Injury Assessment  Goal: Absence of physical injury  Outcome: Progressing     Problem: Pain  Goal: Verbalizes/displays adequate comfort level or baseline comfort level  Outcome: Progressing     Problem: Skin/Tissue Integrity  Goal: Absence of new skin breakdown  Description: 1. Monitor for areas of redness and/or skin breakdown  2. Assess vascular access sites hourly  3. Every 4-6 hours minimum:  Change oxygen saturation probe site  4. Every 4-6 hours:  If on nasal continuous positive airway pressure, respiratory therapy assess nares and determine need for appliance change or resting period.   Outcome: Progressing     Problem: Neurosensory - Adult  Goal: Achieves stable or improved neurological status  Outcome: Progressing  Goal: Achieves maximal functionality and self care  Outcome: Progressing     Problem: Respiratory - Adult  Goal: Achieves optimal ventilation and oxygenation  Outcome: Progressing     Problem: Cardiovascular - Adult  Goal: Maintains optimal cardiac output and hemodynamic stability  Outcome: Progressing     Problem: Skin/Tissue Integrity - Adult  Goal: Incisions, wounds, or drain sites healing without S/S of infection  Outcome: Progressing     Problem: Musculoskeletal - Adult  Goal: Return mobility to safest level of function  Outcome: Progressing  Goal: Maintain proper alignment of affected body part  Outcome: Progressing     Problem: Gastrointestinal - Adult  Goal: Minimal or absence of nausea and vomiting  Outcome: Progressing  Goal: Maintains or returns to baseline bowel function  Outcome: Progressing     Problem:

## 2023-11-29 NOTE — CARE COORDINATION
ARU  Discharge Summary    D/C Date: 11/29/2023    Patient discharged to: Home with spouse    Transported by: Spouse    Referrals made to: [facility] and [facility]    Additional information: Follow up appointments scheduled with MARCO A Mota Friday Dec 1, 2023 8:45 AM and  Olya Rob MD Tuesday Dec 5, 2023 1:30 PM. Patient notified, AVS updated    Caregiver training: none requested    Discharge BIMS completed:  [x]

## 2023-11-29 NOTE — PROGRESS NOTES
Lovenox teaching completed and return demonstration. Prescriptions given to patient. Reviewed instuctions with patient and . Escorted out to car to . Stand by assist to get into car due to hip precautions. Very thankful and appreciative for care and services.

## 2023-11-29 NOTE — PROGRESS NOTES
noticed. Or the opposite- being so fidgety or restless that you have been moving around a lot more than usual.   [] 0. No  [] 1. Yes  [] 9. No Response [] 0. Never or one day  [] 1.  2-6 days (several days)  [] 2.  7-11 days (half or more of days)  [] 3.  12-14 days (nearly every day   Thoughts that you would be better off dead, or of hurting yourself in some way.   [] 0. No  [] 1. Yes  [] 9. No Response [] 0. Never or one day  [] 1.  2-6 days (several days)  [] 2.  7-11 days (half or more of days)  [] 3.  12-14 days (nearly every day     Social Isolation  \"How often do you feel lonely or isolated from those around you? \"  [x] 0. Never  [] 1. Rarely  [] 2. Sometimes  [] 3. Often  [] 4. Always  [] 8. Patient unable to respond    Pain Effect on Sleep  \"Over the past 5 days, how much of the time has pain made it hard for you to sleep at night? \"  [x]  0. Does not apply - I have not had any pain or hurting in the past 5 days  []  1. Rarely or not at all  []  2. Occasionally  []  3. Frequently  []  4. Almost constantly  []  8. Unable to answer  **If the patient answers \"0. Does not apply\" to this question, skip the next two \"Pain Effect. [place] \" questions**      Pain Interference with Therapy Activities  \"Over the past 5 days, how often have you limited your participation in rehabilitation therapy sessions due to pain? \"  []  1. Rarely or not at all  []  2. Occasionally  []  3. Frequently  []  4. Almost constantly  []  8. Unable to answer    Pain Interference with Day-to-Day Activities: \"Over the past 5 days, how often have you limited your day-to-day activities (excluding rehabilitation therapy session)? \"  []  1. Rarely or not at all  []  2. Occasionally  []  3. Frequently  []  4. Almost constantly  []  8.   Unable to answer    Verification that the patient's paper prescriptions were provided to the patient at time of discharge   (NEW QUESTION)  [x] Yes, the prescriptions were provided to the patient  [] No, these were not provided.         Reason:

## 2023-11-30 NOTE — DISCHARGE SUMMARY
with reacher            Bed Mobility:   Sit to Lying  Assistance Needed: Independent  Comment: with rail  CARE Score: 6  Discharge Goal: Independent  Roll Left and Right  Comment: n/a due to L humeral fx  Reason if not Attempted: Not attempted due to medical condition or safety concerns  CARE Score: 88  Discharge Goal: Not Attempted  Lying to Sitting on Side of Bed  Assistance Needed: Independent  Comment: with rail  CARE Score: 6  Discharge Goal: Independent    Transfers:    Sit to Stand  Assistance Needed: Independent  Comment: with quad cane  CARE Score: 6  Discharge Goal: Independent  Chair/Bed-to-Chair Transfer  Assistance Needed: Independent  Comment: with LBQC  CARE Score: 6  Discharge Goal: Independent     Car Transfer  Assistance Needed: Independent  Comment: approach with LBQC  CARE Score: 6  Discharge Goal: Independent    Ambulation:    Walking Ability  Does the Patient Walk?: Yes     Walk 10 Feet  Assistance Needed: Independent  Physical Assistance Level: No physical assistance  Comment: Pt uses quad cane  CARE Score: 6  Discharge Goal: Independent     Walk 50 Feet with Two Turns  Assistance Needed: Independent  Comment: EARL, pt consistently 2 point gait  CARE Score: 6  Discharge Goal: Independent     Walk 150 Feet  Assistance Needed: Independent  Comment: LOTTIEQC, consistent 2 point gait  CARE Score: 6  Discharge Goal: Independent     Walking 10 Feet on Uneven Surfaces  Assistance Needed: Independent  Comment: LOTTIEQC with good safety awareness  CARE Score: 6  Discharge Goal: Independent     1 Step (Curb)  Assistance Needed: Independent  Comment: EARL, performed twice with good adjustments in set up  CARE Score: 6  Discharge Goal: Independent     4 Steps  Assistance Needed: Independent  Comment: mod I with rail on R ascending and descending  CARE Score: 6  Discharge Goal: Independent     12 Steps  Assistance Needed: Independent  Comment: R rail ascending and descending with step-to pattern  CARE Score: strength  how much to take  when to take this            CONTINUE taking these medications      atorvastatin 10 MG tablet  Commonly known as: LIPITOR     sertraline 100 MG tablet  Commonly known as: ZOLOFT            STOP taking these medications      hydrOXYzine HCl 25 MG tablet  Commonly known as: ATARAX     naproxen 500 MG tablet  Commonly known as: NAPROSYN               Where to Get Your Medications        You can get these medications from any pharmacy    Bring a paper prescription for each of these medications  enoxaparin 40 MG/0.4ML  fluticasone 50 MCG/ACT nasal spray  levothyroxine 137 MCG tablet           CONDITION ON DISCHARGE: Stable. The prognosis is good for further improvements in ADL's and safety with adapted gait/transfers. Record review, patient exam, discharge instructions, medication reconciliation and summary for this discharge visit took more than 30 minutes.

## 2023-12-04 ENCOUNTER — HOSPITAL ENCOUNTER (OUTPATIENT)
Dept: PHYSICAL THERAPY | Age: 73
Setting detail: THERAPIES SERIES
Discharge: HOME | End: 2023-12-04
Payer: MEDICARE

## 2023-12-04 PROCEDURE — 97110 THERAPEUTIC EXERCISES: CPT

## 2023-12-04 PROCEDURE — 97162 PT EVAL MOD COMPLEX 30 MIN: CPT

## 2023-12-04 PROCEDURE — 97530 THERAPEUTIC ACTIVITIES: CPT

## 2023-12-04 NOTE — PROGRESS NOTES
established plan, reviewed by me at least every 90 days. These services are related to the diagnosis stated above and are medically necessary.     Date last seen by physician:_________________________________________________    Physician Signature:____________________________________________Date:_______________

## 2023-12-04 NOTE — FLOWSHEET NOTE
Outpatient Physical Therapy  [facility]           [] Phone: [phone]   Fax: [phone]  [facility]           [x] Phone: [phone]   Fax: [phone]        Physical Therapy Daily Treatment Note  Date:  2023    Patient Name:  Elaine Officer    :  1950  MRN: [id number]  Restrictions/Precautions: fall risk, NWB L UE  Diagnosis:   Closed fracture of trochanteric section of femur ([address]) Sigifredo Nieves    Date of Injury/Surgery: 11/10/23  Treatment Diagnosis:   M62.81 muscle weakness, R26.89 abnormality of gait  Insurance/Certification information:  Medicare  Referring Physician:  Jose Rueda MD     PCP: Eda Bloch, PA  Plan of care signed (Y/N):  eval faxed  Outcome Measure:  LEFs: 32.50% ability = 67.50% disability   2MWT: 103.3 feet  Visit# / total visits:  /10 then PN  Pain level: 0/10   Goals:     Patient goals:   \"to get back to normal\"    Short term goals to be achieved by 2024:  Short term goal 1: Pt will report compliance with current HEP as prescribed in order to improve ROM and strength. Short term goal 2: Pt will demonstrate the ability to safely complete at least 140 feet on the 2 minute walk test with the least restrictive AD in order to improve functional mobility. Short term goal 3: Pt will demonstrate a LEFs score of no more than 60% disability in order to improve quality of life. Short term goal 4: Pt will report worst pain in the last week no more than 6/10 in order to improve quality of life. Subjective:  See eval     Any changes in Ambulatory Summary Sheet?   None    Objective:  See eval     Exercises: (No more than 4 columns)   Exercise/Equipment Date: 23 Date Date           WARM UP       NuStep    X10' S8/R A 10 lvl 3           TABLE      Glute set      LAQ  5\" hold                         STANDING      Heel raises 1x12 B LE     Forward marching no hurdles 3 laps     Side stepping 3 ;laps     Hip abd and ext 1x12 ea B LE // bars

## 2023-12-07 ENCOUNTER — HOSPITAL ENCOUNTER (OUTPATIENT)
Dept: PHYSICAL THERAPY | Age: 73
Setting detail: THERAPIES SERIES
Discharge: HOME | End: 2023-12-07
Payer: MEDICARE

## 2023-12-07 PROCEDURE — 97530 THERAPEUTIC ACTIVITIES: CPT

## 2023-12-07 PROCEDURE — 97110 THERAPEUTIC EXERCISES: CPT

## 2023-12-07 NOTE — FLOWSHEET NOTE
Outpatient Physical Therapy  [place]           [] Phone: [phone]   Fax: [phone]  [facility]           [x] Phone: [phone]   Fax: [phone]        Physical Therapy Daily Treatment Note  Date:  2023    Patient Name:  Papi Zazueta    :  1950  MRN: [id number]  Restrictions/Precautions: fall risk, NWB L UE  Diagnosis:   Closed fracture of trochanteric section of femur ([address]) Yanira Horse    Date of Injury/Surgery: 11/10/23  Treatment Diagnosis:   M62.81 muscle weakness, R26.89 abnormality of gait  Insurance/Certification information:  Medicare  Referring Physician:  Marylee Olmstead, MD     PCP: MARCO A Inman  Plan of care signed (Y/N):  eval faxed  Outcome Measure:  LEFs: 32.50% ability = 67.50% disability   2MWT: 103.3 feet  Visit# / total visits:  2/10 then PN  Pain level: 4-5/10   Goals:     Patient goals:   \"to get back to normal\"    Short term goals to be achieved by 2024:  Short term goal 1: Pt will report compliance with current HEP as prescribed in order to improve ROM and strength. Short term goal 2: Pt will demonstrate the ability to safely complete at least 140 feet on the 2 minute walk test with the least restrictive AD in order to improve functional mobility. Short term goal 3: Pt will demonstrate a LEFs score of no more than 60% disability in order to improve quality of life. Short term goal 4: Pt will report worst pain in the last week no more than 6/10 in order to improve quality of life. Subjective:   Patient reports of 4-5/10 pain upon arrival and feels she may have done to much yesterday. Any changes in Ambulatory Summary Sheet?   None    Objective:   new exs added without an increase in pain    Exercises: (No more than 4 columns)   Exercise/Equipment Date: 23 Date 23 Date           WARM UP       NuStep    X10' S8/R A 10 lvl 3 S8 Lv3 10'          TABLE      Glute set  10 + reps    LAQ  10x5\" hold      10x B

## 2023-12-11 ENCOUNTER — HOSPITAL ENCOUNTER (OUTPATIENT)
Dept: PHYSICAL THERAPY | Age: 73
Setting detail: THERAPIES SERIES
Discharge: HOME | End: 2023-12-11
Payer: MEDICARE

## 2023-12-11 PROCEDURE — 97110 THERAPEUTIC EXERCISES: CPT

## 2023-12-11 PROCEDURE — 97530 THERAPEUTIC ACTIVITIES: CPT

## 2023-12-11 NOTE — FLOWSHEET NOTE
Outpatient Physical Therapy  [place]           [] Phone: [phone]   Fax: [phone]  [facility]           [x] Phone: [phone]   Fax: [phone]        Physical Therapy Daily Treatment Note  Date:  2023    Patient Name:  Papi Zazueta    :  1950  MRN: [id number]  Restrictions/Precautions: fall risk, NWB L UE  Diagnosis:   Closed fracture of trochanteric section of femur ([address]) Yanira Horse    Date of Injury/Surgery: 11/10/23  Treatment Diagnosis:   M62.81 muscle weakness, R26.89 abnormality of gait  Insurance/Certification information:  Medicare  Referring Physician:  Marylee Olmstead, MD     PCP: MARCO A Inman  Plan of care signed (Y/N):  nely faxed  Outcome Measure:  LEFs: 32.50% ability = 67.50% disability   2MWT: 103.3 feet  Visit# / total visits:  3/10 then PN  Pain level: 2/10   Goals:     Patient goals:   \"to get back to normal\"    Short term goals to be achieved by 2024:  Short term goal 1: Pt will report compliance with current HEP as prescribed in order to improve ROM and strength. - Reports compliance  Short term goal 2: Pt will demonstrate the ability to safely complete at least 140 feet on the 2 minute walk test with the least restrictive AD in order to improve functional mobility. - 163ft  Short term goal 3: Pt will demonstrate a LEFs score of no more than 60% disability in order to improve quality of life. - 36.25%  Short term goal 4: Pt will report worst pain in the last week no more than 6/10 in order to improve quality of life. - 5/10     Subjective:   Patient reports of 2/10 pain upon arrival and reports today is her last day due to leaving for [place] on Thursday. Any changes in Ambulatory Summary Sheet?   None    Objective:   See goals above    Exercises: (No more than 4 columns)   Exercise/Equipment Date: 23 Date 23 Date 23           WARM UP       NuStep    X10' S8/R A 10 lvl 3 S8 Lv3 10' S8 Lv3 10'         TABLE      Glute

## 2023-12-14 NOTE — DISCHARGE SUMMARY
[facility]  [place] [address]  Phone: [phone]  Fax: [phone]      Physician:         From: Naldo Herrera, PT, DPT     Patient: Kellee Hernadez                  : 1950  Diagnosis:       Date: 2023  Treatment Diagnosis:      []  Progress Note                [x]  Discharge Note    Total Visits to date:  3  Cancels/No-shows to date:  0    Subjective: Patient reports of 2/10 pain upon arrival and reports today is her last day due to leaving for [place] on Thursday. Plan of Care/Treatment to date:  [x] Therapeutic Exercise    [] Modalities:  [x] Therapeutic Activity     [] Ultrasound  [] Electric Stimulation  [x] Gait Training      [] Cervical Traction    [] Lumbar Traction  [x] Neuromuscular Re-education  [] Cold/hotpack [] Iontophoresis  [x] Instruction in HEP      Other:  [] Manual Therapy       []  Vasopneumatic  [] Aquatic Therapy       []                          Objective/Significant Findings At Last Visit/Comments:  pt reports compliance with HEP and worst pain in the last week 5/10  2 minute walk test: 163 feet. LEFs: 51/80 = 63.75% ability = 36.25% disability     Assessment: Pt is a pleasant 76 yo female who has been seen by skilled PT following falls and fractures. She is being discharged secondary to leaving for [place] for the winter.      Goal Status:  [x] Achieved [] Partially Achieved  [] Not Achieved   Short term goals to be achieved by 2024:  Short term goal 1: Pt will report compliance with current HEP as prescribed in order to improve ROM and strength.  - MET, discharge goal.   Short term goal 2: Pt will demonstrate the ability to safely complete at least 140 feet on the 2 minute walk test with the least restrictive AD in order to improve functional mobility. - MET, discharge goal.   Short term goal 3: Pt will demonstrate a LEFs score of no more than 60% disability in order to improve quality

## 2024-06-11 ENCOUNTER — TRANSCRIBE ORDERS (OUTPATIENT)
Dept: ADMINISTRATIVE | Age: 74
End: 2024-06-11

## 2024-06-11 DIAGNOSIS — R19.8 ABDOMINAL FULLNESS: Primary | ICD-10-CM

## 2024-06-14 ENCOUNTER — HOSPITAL ENCOUNTER (OUTPATIENT)
Dept: CT IMAGING | Age: 74
Discharge: HOME | End: 2024-06-14
Payer: MEDICARE

## 2024-06-14 DIAGNOSIS — R19.8 ABDOMINAL FULLNESS: ICD-10-CM

## 2024-06-14 PROCEDURE — 6360000004 HC RX CONTRAST MEDICATION: Performed by: FAMILY MEDICINE

## 2024-06-14 PROCEDURE — 74177 CT ABD & PELVIS W/CONTRAST: CPT

## 2024-06-14 RX ADMIN — IOPAMIDOL 20 ML: 755 INJECTION, SOLUTION INTRAVENOUS at 10:59

## 2024-06-14 RX ADMIN — IOPAMIDOL 100 ML: 755 INJECTION, SOLUTION INTRAVENOUS at 10:59

## 2024-08-19 ENCOUNTER — HOSPITAL ENCOUNTER (EMERGENCY)
Age: 74
Discharge: HOME | End: 2024-08-19
Attending: EMERGENCY MEDICINE
Payer: MEDICARE

## 2024-08-19 ENCOUNTER — APPOINTMENT (OUTPATIENT)
Dept: GENERAL RADIOLOGY | Age: 74
End: 2024-08-19
Payer: MEDICARE

## 2024-08-19 ENCOUNTER — APPOINTMENT (OUTPATIENT)
Dept: CT IMAGING | Age: 74
End: 2024-08-19
Payer: MEDICARE

## 2024-08-19 VITALS
BODY MASS INDEX: 27.46 KG/M2 | SYSTOLIC BLOOD PRESSURE: 157 MMHG | WEIGHT: 155 LBS | HEART RATE: 88 BPM | HEIGHT: 63 IN | OXYGEN SATURATION: 96 % | TEMPERATURE: 97.6 F | DIASTOLIC BLOOD PRESSURE: 75 MMHG | RESPIRATION RATE: 18 BRPM
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
## 2024-08-19 DIAGNOSIS — S40.022A CONTUSION OF MULTIPLE SITES OF LEFT SHOULDER AND UPPER ARM, INITIAL ENCOUNTER: ICD-10-CM

## 2024-08-19 DIAGNOSIS — S09.90XA CLOSED HEAD INJURY, INITIAL ENCOUNTER: ICD-10-CM

## 2024-08-19 DIAGNOSIS — S40.012A CONTUSION OF MULTIPLE SITES OF LEFT SHOULDER AND UPPER ARM, INITIAL ENCOUNTER: ICD-10-CM

## 2024-08-19 PROCEDURE — 99284 EMERGENCY DEPT VISIT MOD MDM: CPT

## 2024-08-19 PROCEDURE — 6370000000 HC RX 637 (ALT 250 FOR IP): Performed by: EMERGENCY MEDICINE

## 2024-08-19 PROCEDURE — 70450 CT HEAD/BRAIN W/O DYE: CPT

## 2024-08-19 PROCEDURE — 73080 X-RAY EXAM OF ELBOW: CPT

## 2024-08-19 PROCEDURE — 73060 X-RAY EXAM OF HUMERUS: CPT

## 2024-08-19 RX ORDER — HYDROCODONE BITARTRATE AND ACETAMINOPHEN 5; 325 MG/1; MG/1
1 TABLET ORAL ONCE
Status: DISCONTINUED | OUTPATIENT
Start: 2024-08-19 | End: 2024-08-19

## 2024-08-19 RX ORDER — ACETAMINOPHEN 500 MG
1000 TABLET ORAL ONCE
Status: COMPLETED | OUTPATIENT
Start: 2024-08-19 | End: 2024-08-19

## 2024-08-19 RX ORDER — BACITRACIN ZINC 500 [USP'U]/G
OINTMENT TOPICAL ONCE
Status: COMPLETED | OUTPATIENT
Start: 2024-08-19 | End: 2024-08-19

## 2024-08-19 RX ADMIN — ACETAMINOPHEN 1000 MG: 500 TABLET ORAL at 15:27

## 2024-08-19 RX ADMIN — BACITRACIN ZINC: 500 OINTMENT TOPICAL at 15:26

## 2024-08-19 ASSESSMENT — PAIN - FUNCTIONAL ASSESSMENT: PAIN_FUNCTIONAL_ASSESSMENT: 0-10

## 2024-08-19 ASSESSMENT — PAIN DESCRIPTION - ORIENTATION: ORIENTATION: RIGHT

## 2024-08-19 ASSESSMENT — PAIN DESCRIPTION - LOCATION: LOCATION: ARM

## 2024-08-19 NOTE — DISCHARGE INSTRUCTIONS
You could have a subtle fracture in your left arm.  Follow-up within a week after orthopedics will be prudent for reevaluation and possible repeat x-ray.  Usual sling for comfort .      Thank you for allowing us to be involved in your care today.  Follow up as discussed during your stay. This information is included in your discharge paperwork.  Appropriate followup is essential in your continued care after today's visit. If you had any diagnostic studies ( Labs or Xray's, CAT scan, Ultrasound ) have your PCP (Primary Care Physician) review them with you since there may be results that require further follow up or investigation.    Return to the Emergency Department at any point with worsening condition or concerns.      Please follow up with your family doctor or one of your choosing.  You may find a provider through [facility], [place], Thank You for choosing [facility]        We are here 24 hours a day, 7 days a week, and are always here for you.      Dr. Rick Welsh  Board Certified  Clinical Professor  Emergency Medicine

## 2024-08-19 NOTE — ED PROVIDER NOTES
encounter    2. Closed head injury, initial encounter    3. Contusion of multiple sites of left shoulder and upper arm, initial encounter      Disposition referral (if applicable):  Malik Brennan MD  [address], [address]  [place] [address]  [phone]    Go in 1 week  If symptoms worsen    Disposition medications (if applicable):  New Prescriptions    No medications on file           Rick Welsh DO, FACERAUL      Comment: Please note this report has been produced using speech recognition software and maycontain errors related to that system including errors in grammar, punctuation, and spelling, as well as words and phrases that may be inappropriate. If there are any questions or concerns please feel free to contact thedictating provider for clarification.        Rick Welsh DO  08/19/24 [id number]

## 2024-08-19 NOTE — ED TRIAGE NOTES
Patient presented to ED with complaints of left arm pain/injury. Patient states she fractured her humerus last November. States she was walking up a hill and fell around noon today. Patient also has some abrasions to her left knee and bruising to the left side of her forehead. Denies any loc.

## (undated) DEVICE — TUBING, SUCTION, 9/32" X 10', STRAIGHT: Brand: MEDLINE

## (undated) DEVICE — SOLUTION IV IRRIG WATER 1000ML POUR BRL 2F7114

## (undated) DEVICE — BANDAGE,GAUZE,BULKEE II,4.5"X4.1YD,STRL: Brand: MEDLINE

## (undated) DEVICE — SUTURE ABSORBABLE BRAIDED 2-0 CT-1 27 IN UD VICRYL J259H

## (undated) DEVICE — Device

## (undated) DEVICE — Z DISCONTINUED USE 2744636  DRESSING AQUACEL 14 IN ALG W3.5XL14IN POLYUR FLM CVR W/ HYDRCOLL

## (undated) DEVICE — DRESSING TRNSPAR W5XL4.5IN FLM SHT SEMIPERMEABLE WIND

## (undated) DEVICE — GAUZE,SPONGE,2"X2",8PLY,STERILE,LF,2'S: Brand: MEDLINE

## (undated) DEVICE — SPONGE LAP W18XL18IN WHT COT 4 PLY FLD STRUNG RADPQ DISP ST 2 PER PACK

## (undated) DEVICE — DRAPE SHEET ULTRAGARD: Brand: MEDLINE

## (undated) DEVICE — BIT DRL L145MM DIA4.2MM NONSTERILE 3 FLUT NDL PNT QUIK CPL

## (undated) DEVICE — GLOVE SURG SZ 8 L12IN THK75MIL DK GRN LTX FREE

## (undated) DEVICE — PACK,BASIC,IX: Brand: MEDLINE

## (undated) DEVICE — GOWN,SIRUS,POLYRNF,BRTHSLV,XLN/XL,20/CS: Brand: MEDLINE

## (undated) DEVICE — LINER,SEMI-RIGID,3000CC,50EA/CS: Brand: MEDLINE

## (undated) DEVICE — YANKAUER,FLEXIBLE HANDLE,REGLR CAPACITY: Brand: MEDLINE INDUSTRIES, INC.

## (undated) DEVICE — COTTON UNDERCAST PADDING,REGULAR FINISH: Brand: WEBRIL

## (undated) DEVICE — GLOVE ORANGE PI 7   MSG9070

## (undated) DEVICE — GLOVE SURG SZ 65 THK91MIL LTX FREE SYN POLYISOPRENE

## (undated) DEVICE — 3M™ STERI-DRAPE™ U-DRAPE 1015: Brand: STERI-DRAPE™

## (undated) DEVICE — TOWEL,OR,DSP,ST,BLUE,STD,6/PK,12PK/CS: Brand: MEDLINE

## (undated) DEVICE — INTENDED FOR TISSUE SEPARATION, AND OTHER PROCEDURES THAT REQUIRE A SHARP SURGICAL BLADE TO PUNCTURE OR CUT.: Brand: BARD-PARKER ® STAINLESS STEEL BLADES

## (undated) DEVICE — GLOVE ORANGE PI 8   MSG9080

## (undated) DEVICE — GUIDEWIRE ORTH L400MM DIA3.2MM FOR TFN

## (undated) DEVICE — GOWN,ECLIPSE,POLYRNF,BRTHSLV,L,30/CS: Brand: MEDLINE

## (undated) DEVICE — SUTURE VCRL SZ 0 L27IN ABSRB UD L36MM CT-1 1/2 CIR J260H

## (undated) DEVICE — ELECTRODE ES AD CRDLSS PT RET REM POLYHESIVE

## (undated) DEVICE — COVER,C-ARM,41X74: Brand: MEDLINE

## (undated) DEVICE — 6617 IOBAN II PATIENT ISOLATION DRAPE 5/BX,4BX/CS: Brand: STERI-DRAPE™ IOBAN™ 2

## (undated) DEVICE — MAT ABSRB W28XL48IN C6L FLR ULT W POLY BK

## (undated) DEVICE — PAD,ABDOMINAL,5"X9",ST,LF,25/BX: Brand: MEDLINE INDUSTRIES, INC.

## (undated) DEVICE — NEEDLE HYPO 20GA L1.5IN YEL POLYPR HUB S STL REG BVL STR

## (undated) DEVICE — COUNTER NDL 30 COUNT FOAM STRP SGL MAG

## (undated) DEVICE — SYRINGE IRRIG 60ML SFT PLIABLE BLB EZ TO GRP 1 HND USE W/

## (undated) DEVICE — MARKER SURG SKIN UTIL REGULAR/FINE 2 TIP W/ RUL AND 9 LBL

## (undated) DEVICE — Z INACTIVE NO ACTIVE SUPPLIER APPLICATOR MEDICATED 26 CC TINT HI-LITE ORNG STRL CHLORAPREP

## (undated) DEVICE — SYRINGE MED 30ML STD CLR PLAS LUERLOCK TIP N CTRL DISP

## (undated) DEVICE — BANDAGE,SELF ADHRNT,COFLEX,4"X5YD,STRL: Brand: COLABEL

## (undated) DEVICE — ROD RMR L950MM DIA2.5MM W/ EXTN BALL TIP

## (undated) DEVICE — PENCIL ES CRD L10FT HND SWCHING ROCK SWCH W/ EDGE COAT BLDE